# Patient Record
Sex: FEMALE | Race: WHITE | Employment: FULL TIME | URBAN - METROPOLITAN AREA
[De-identification: names, ages, dates, MRNs, and addresses within clinical notes are randomized per-mention and may not be internally consistent; named-entity substitution may affect disease eponyms.]

---

## 2017-03-08 ENCOUNTER — HOSPITAL ENCOUNTER (OUTPATIENT)
Dept: MRI IMAGING | Age: 52
Discharge: HOME | End: 2017-03-08
Attending: ORTHOPAEDIC SURGERY | Admitting: ORTHOPAEDIC SURGERY

## 2017-03-08 DIAGNOSIS — M25.511 PAIN IN RIGHT SHOULDER: ICD-10-CM

## 2017-03-08 DIAGNOSIS — M25.511 RIGHT SHOULDER PAIN, UNSPECIFIED CHRONICITY: ICD-10-CM

## 2017-05-03 ENCOUNTER — HOSPITAL ENCOUNTER (OUTPATIENT)
Dept: WOMENS IMAGING | Age: 52
Discharge: HOME | End: 2017-05-03
Attending: FAMILY MEDICINE | Admitting: FAMILY MEDICINE

## 2017-05-03 DIAGNOSIS — Z12.31 VISIT FOR SCREENING MAMMOGRAM: ICD-10-CM

## 2017-09-01 ENCOUNTER — HOSPITAL ENCOUNTER (OUTPATIENT)
Dept: OTHER | Age: 52
Discharge: HOME | End: 2017-09-01
Attending: FAMILY MEDICINE | Admitting: FAMILY MEDICINE

## 2017-09-01 LAB
EKG ATRIAL RATE: 68 BPM
EKG DIAGNOSIS: NORMAL
EKG P AXIS: 16 DEGREES
EKG P-R INTERVAL: 170 MS
EKG Q-T INTERVAL: 404 MS
EKG QRS DURATION: 102 MS
EKG QTC CALCULATION (BAZETT): 429 MS
EKG R AXIS: -37 DEGREES
EKG T AXIS: 26 DEGREES
EKG VENTRICULAR RATE: 68 BPM

## 2017-09-01 PROCEDURE — 93010 ELECTROCARDIOGRAM REPORT: CPT | Performed by: INTERNAL MEDICINE

## 2017-11-21 ENCOUNTER — OFFICE VISIT (OUTPATIENT)
Dept: GYNECOLOGY | Age: 52
End: 2017-11-21

## 2017-11-21 VITALS
WEIGHT: 169.4 LBS | RESPIRATION RATE: 17 BRPM | SYSTOLIC BLOOD PRESSURE: 131 MMHG | HEART RATE: 87 BPM | TEMPERATURE: 97.2 F | DIASTOLIC BLOOD PRESSURE: 81 MMHG | HEIGHT: 64 IN | BODY MASS INDEX: 28.92 KG/M2

## 2017-11-21 DIAGNOSIS — Z01.419 WELL WOMAN EXAM WITH ROUTINE GYNECOLOGICAL EXAM: Primary | ICD-10-CM

## 2017-11-21 DIAGNOSIS — Z78.0 MENOPAUSE: ICD-10-CM

## 2017-11-21 PROCEDURE — 99396 PREV VISIT EST AGE 40-64: CPT | Performed by: OBSTETRICS & GYNECOLOGY

## 2017-11-21 RX ORDER — ESTRADIOL 1 MG/1
1 TABLET ORAL NIGHTLY
Qty: 90 TABLET | Refills: 3 | Status: SHIPPED | OUTPATIENT
Start: 2017-11-21 | End: 2022-08-23

## 2017-11-24 LAB
HPV COMMENT: NORMAL
HPV TYPE 16: NOT DETECTED
HPV TYPE 18: NOT DETECTED
HPVOH (OTHER TYPES): NOT DETECTED

## 2017-11-26 ASSESSMENT — ENCOUNTER SYMPTOMS
RESPIRATORY NEGATIVE: 1
GASTROINTESTINAL NEGATIVE: 1
EYES NEGATIVE: 1

## 2017-11-26 NOTE — PROGRESS NOTES
Subjective:      Patient ID: Manpreet Juarez is a 46 y.o. female. Patient is here for annual. Patient with menopausal symptoms. Wants to know what she can do. Review of Systems   Constitutional: Negative. HENT: Negative. Eyes: Negative. Respiratory: Negative. Cardiovascular: Negative. Gastrointestinal: Negative. Genitourinary: Negative. Musculoskeletal: Negative. Skin: Negative. Neurological: Negative. Psychiatric/Behavioral: Negative. Date of Birth 1965  Past Medical History:   Diagnosis Date    Anxiety and depression     Cervical dysplasia 25 years ago    Dysmenorrhea 2012    Endometriosis 7-8 years ago    Wears glasses      Past Surgical History:   Procedure Laterality Date    ARM SURGERY Left     left arm fracture surgery    BUNIONECTOMY Right 2014     SECTION      couldn't deliver    DILATION AND CURETTAGE OF UTERUS  7-8 years ago    ENDOMETRIAL ABLATION      ENDOMETRIAL BIOPSY  2012    thickenedned endometrium    FOOT SURGERY Left 16    Silver type bunionectomy of the left 1st MPJ    HARDWARE REMOVAL      KNEE ARTHROSCOPY Right     LEEP  25 years ago    PELVIC LAPAROSCOPY      endometriosis    SHOULDER SURGERY      rotator cuff     OB History    Para Term  AB Living   1 1 1     1   SAB TAB Ectopic Molar Multiple Live Births             1      # Outcome Date GA Lbr Shubham/2nd Weight Sex Delivery Anes PTL Lv   1 Term  40w0d   F CS-Unspec   DRU      Birth Comments: couldn't get her out        Social History     Social History    Marital status:      Spouse name: N/A    Number of children: N/A    Years of education: N/A     Occupational History    Not on file.      Social History Main Topics    Smoking status: Former Smoker     Packs/day: 0.50     Years: 30.00     Quit date: 12/15/2011    Smokeless tobacco: Former User     Quit date: 2016      Comment: cessation , 10/15    Alcohol use

## 2018-05-09 ENCOUNTER — HOSPITAL ENCOUNTER (OUTPATIENT)
Dept: WOMENS IMAGING | Age: 53
Discharge: HOME | End: 2018-05-09
Attending: OBSTETRICS & GYNECOLOGY | Admitting: OBSTETRICS & GYNECOLOGY

## 2018-05-09 DIAGNOSIS — Z12.31 VISIT FOR SCREENING MAMMOGRAM: ICD-10-CM

## 2018-07-10 ENCOUNTER — HOSPITAL ENCOUNTER (OUTPATIENT)
Dept: MRI IMAGING | Age: 53
Discharge: HOME | End: 2018-07-10
Attending: NURSE PRACTITIONER | Admitting: NURSE PRACTITIONER

## 2018-07-10 DIAGNOSIS — M54.5 CHRONIC LOW BACK PAIN, UNSPECIFIED BACK PAIN LATERALITY, WITH SCIATICA PRESENCE UNSPECIFIED: ICD-10-CM

## 2018-07-10 DIAGNOSIS — G89.29 OTHER CHRONIC PAIN: ICD-10-CM

## 2018-07-10 DIAGNOSIS — G89.29 CHRONIC LOW BACK PAIN, UNSPECIFIED BACK PAIN LATERALITY, WITH SCIATICA PRESENCE UNSPECIFIED: ICD-10-CM

## 2018-08-23 ENCOUNTER — OFFICE VISIT (OUTPATIENT)
Dept: ORTHOPEDIC SURGERY | Age: 53
End: 2018-08-23

## 2018-08-23 VITALS
SYSTOLIC BLOOD PRESSURE: 117 MMHG | BODY MASS INDEX: 26.46 KG/M2 | HEIGHT: 64 IN | WEIGHT: 155 LBS | HEART RATE: 87 BPM | DIASTOLIC BLOOD PRESSURE: 82 MMHG | RESPIRATION RATE: 16 BRPM

## 2018-08-23 DIAGNOSIS — M54.16 LUMBAR RADICULITIS: Primary | ICD-10-CM

## 2018-08-23 PROCEDURE — 3017F COLORECTAL CA SCREEN DOC REV: CPT | Performed by: NURSE PRACTITIONER

## 2018-08-23 PROCEDURE — G8427 DOCREV CUR MEDS BY ELIG CLIN: HCPCS | Performed by: NURSE PRACTITIONER

## 2018-08-23 PROCEDURE — G8419 CALC BMI OUT NRM PARAM NOF/U: HCPCS | Performed by: NURSE PRACTITIONER

## 2018-08-23 PROCEDURE — 99243 OFF/OP CNSLTJ NEW/EST LOW 30: CPT | Performed by: NURSE PRACTITIONER

## 2018-08-23 RX ORDER — GABAPENTIN 300 MG/1
300 CAPSULE ORAL 3 TIMES DAILY
Qty: 40 CAPSULE | Refills: 0 | Status: SHIPPED | OUTPATIENT
Start: 2018-08-23 | End: 2018-09-05 | Stop reason: SDUPTHER

## 2018-08-23 NOTE — PROGRESS NOTES
History of present illness:   Ms. Diaz Jacob  is a pleasant 48 y.o. female here today for  Evaluation regarding her low back pain with right posterior greater than left posterior leg pain. She tells me she has had low back issues on off for the next 5-10 years. She began experiencing leg symptoms approximately 1 year ago without specific inciting event or injury. She denies any numbness, tingling, or weakness of either lower extremity. She tells me her pain is worse with extended periods of sitting. Pain travels down the posterior right leg into her calf. Extends down her left posterior leg into her mid thigh. She has tried oral steroids without much relief. She has tried OTC analgesics without much relief. She has tried chiropractic care without relief. She denies bowel or bladder dysfunction. This a consult for low back and right leg pain from Wolfgang Garcia MD.    Past history:  Her past medical, surgical and social history has been reviewed. Her  medications and allergies were reviewed. Review of symptoms:  Pertinent items are noted in HPI. Review of systems reviewed from Patient History Form dated on 8/23/18 and available in the patient's chart under the Media tab. Physical examination:  Ms. Selena Curling Fite's most recent vitals:  Vitals  Height: 5' 4\" (162.6 cm)  Weight: 155 lb (70.3 kg)  Body mass index is 26.61 kg/m². She is well-developed and well-nourished, is in  Some apparent discomfort and alert and oriented to person, place, and time. She demonstrates appropriate mood and affect. Her skin is warm and dry. Her gait is largely normal and she walks without assistive devices. She stands with slight lumbar flexion. Her lumbar flexion, extension and lateral bending are moderately reduced with pain. She has mild tenderness over her lumbar spine without obvious muscle spasm. The skin over her lumbar spine is normal without a surgical scar.  She has 5/5 motor strength of bilateral lower extremities. She has a negative straight leg raise, bilaterally. 1+ deep tendon reflexes at knees. Sensation is intact to light touch L3 to S1 bilaterally. She has no clonus. Hip range of motion painless. Imaging:  I reviewed MRI images of the lumbar spine from 7/10/18 in the office today. There is evidence of bilateral facet hypertrophy L3 through S1. There is moderate bilateral foraminal stenosis L4/5 secondary to broad-based disc bulge. Assessment:  Lumbar radiculitis  Lumbar facet arthropathy    Plan:   I recommend patient try formal physical therapy program.  A prescription for gabapentin was sent to her pharmacy and she was given appropriate administration instructions regarding this. If her symptoms do not improve the next 4-6 weeks she will call the office to consider a bilateral L4 TF NASEEM.

## 2021-11-01 ENCOUNTER — HOSPITAL ENCOUNTER (OUTPATIENT)
Dept: WOMENS IMAGING | Age: 56
Discharge: HOME | End: 2021-11-01
Payer: COMMERCIAL

## 2021-11-01 DIAGNOSIS — Z12.31 BREAST CANCER SCREENING BY MAMMOGRAM: ICD-10-CM

## 2021-11-01 PROCEDURE — 77067 SCR MAMMO BI INCL CAD: CPT

## 2022-08-23 ENCOUNTER — OFFICE VISIT (OUTPATIENT)
Dept: ORTHOPEDIC SURGERY | Age: 57
End: 2022-08-23
Payer: COMMERCIAL

## 2022-08-23 VITALS — RESPIRATION RATE: 16 BRPM | BODY MASS INDEX: 24.45 KG/M2 | HEIGHT: 63 IN | WEIGHT: 138 LBS

## 2022-08-23 DIAGNOSIS — M75.22 TENDINITIS OF LONG HEAD OF BICEPS BRACHII OF LEFT SHOULDER: Primary | ICD-10-CM

## 2022-08-23 DIAGNOSIS — G89.29 CHRONIC LEFT SHOULDER PAIN: ICD-10-CM

## 2022-08-23 DIAGNOSIS — M75.82 TENDINITIS OF LEFT ROTATOR CUFF: ICD-10-CM

## 2022-08-23 DIAGNOSIS — M25.512 CHRONIC LEFT SHOULDER PAIN: ICD-10-CM

## 2022-08-23 PROCEDURE — G8420 CALC BMI NORM PARAMETERS: HCPCS | Performed by: ORTHOPAEDIC SURGERY

## 2022-08-23 PROCEDURE — 99203 OFFICE O/P NEW LOW 30 MIN: CPT | Performed by: ORTHOPAEDIC SURGERY

## 2022-08-23 PROCEDURE — G8427 DOCREV CUR MEDS BY ELIG CLIN: HCPCS | Performed by: ORTHOPAEDIC SURGERY

## 2022-08-23 PROCEDURE — 4004F PT TOBACCO SCREEN RCVD TLK: CPT | Performed by: ORTHOPAEDIC SURGERY

## 2022-08-23 PROCEDURE — 3017F COLORECTAL CA SCREEN DOC REV: CPT | Performed by: ORTHOPAEDIC SURGERY

## 2022-08-23 RX ORDER — BUPROPION HYDROCHLORIDE 300 MG/1
TABLET ORAL
COMMUNITY
Start: 2022-08-16

## 2022-08-23 RX ORDER — DIAZEPAM 5 MG/1
TABLET ORAL
COMMUNITY
Start: 2022-06-13

## 2022-08-23 RX ORDER — TRAZODONE HYDROCHLORIDE 50 MG/1
TABLET ORAL
COMMUNITY
Start: 2022-08-11

## 2022-08-23 RX ORDER — ATORVASTATIN CALCIUM 20 MG/1
TABLET, FILM COATED ORAL
COMMUNITY
Start: 2022-08-08

## 2022-08-23 RX ORDER — LISINOPRIL 20 MG/1
TABLET ORAL
COMMUNITY
Start: 2022-08-07

## 2022-08-23 NOTE — PROGRESS NOTES
CHIEF COMPLAINT: Left shoulder pain    History:    Jorge Trejo is a 62 y.o. right handed female self-referred for evaluation and treatment of Left shoulder pain. This is evaluated as a personal injury. The pain began years ago. Pain is rated as a 6/10. There was not an injury. Pain is located anteriorly. Symptoms are worse with lifting, overhead activity, reaching behind her back, and laying on her side. The patient has not had PT. The patient has not had an injection. The patient has not tried NSAIDs. The patient has not tried ice. 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 Past Medical History:   Diagnosis Date    Anxiety and depression     Cervical dysplasia 25 years ago    Dysmenorrhea 2012    Endometriosis 7-8 years ago    Wears glasses        Past Surgical History:   Procedure Laterality Date    ARM SURGERY Left     left arm fracture surgery    BUNIONECTOMY Right 2014     SECTION      couldn't deliver    DILATION AND CURETTAGE OF UTERUS  7-8 years ago    ENDOMETRIAL ABLATION      ENDOMETRIAL BIOPSY  2012    thickenedned endometrium    FOOT SURGERY Left 16    Silver type bunionectomy of the left 1st MPJ    HARDWARE REMOVAL      KNEE ARTHROSCOPY Right     LEEP  25 years ago    PELVIC LAPAROSCOPY      endometriosis    SHOULDER SURGERY      rotator cuff       Current Outpatient Medications on File Prior to Visit   Medication Sig Dispense Refill    atorvastatin (LIPITOR) 20 MG tablet TAKE 1 TABLET BY MOUTH AT BEDTIME      buPROPion (WELLBUTRIN XL) 300 MG extended release tablet TAKE 1 TABLET BY MOUTH EVERY DAY      diazePAM (VALIUM) 5 MG tablet TAKE 1 TABLET BY MOUTH TWO TIMES A DAY AS NEEDED      lisinopril (PRINIVIL;ZESTRIL) 20 MG tablet TAKE 1 TABLET BY MOUTH EVERY DAY      traZODone (DESYREL) 50 MG tablet TAKE 1 TABLET BY MOUTH AT BEDTIME      PARoxetine (PAXIL) 20 MG tablet        No current facility-administered medications on file prior to visit. positive   Strength:  5/5 Supraspinatus, External rotation bilateral    Drop-arm test:   negative   Belly-press test:   negative   Bear-hug test:   negative   Speed's test:   positive   Bicipital groove tenderness:  positive   Arenas's test:   negative   Cross-body adduction test:   negative    AC joint tenderness:   negative     There are no skin lesions, cellulitis, or extreme edema in the upper extremities. Sensation is grossly intact to light touch bilaterally upper extremity. The patient has warm and well-perfused bilateral upper extremities with brisk capillary refill. Imaging   Left Shoulder X-Ray: 3 view x-rays of the shoulder including AP, scapular Y, and axillary obtained and reviewed  AC Joint: narrowing Marked, moderate osteophytes  Glenohumeral joint: no abnormalities noted  Elevation humeral head: absent      Assessment:      Left shoulder long head biceps tendinitis  Left shoulder rotator cuff tendinitis      Plan:      Natural history and expected course discussed. Questions answered. Ice as needed. Continue NSAIDs as needed. Discussed corticosteroid injection. She deferred at this time. PT referral provided. Follow-up in 2 months. Call IF NO improvement with PT after 4-6 weeks and I will order MRI prior to patient being seen back in office. Gerard Gallardo. Jen Bowie MD  Orthopaedic Surgery and Sports Medicine     Disclaimer: This note was generated with use of a verbal recognition program and an attempt was made to check for errors. It is possible that there are still dictated errors within this office note. If so, please bring any significant errors to my attention for an addendum. All efforts were made to ensure that this office note is accurate.

## 2022-09-01 ENCOUNTER — HOSPITAL ENCOUNTER (OUTPATIENT)
Dept: PHYSICAL THERAPY | Age: 57
Setting detail: THERAPIES SERIES
Discharge: HOME | End: 2022-09-01
Payer: COMMERCIAL

## 2022-09-01 NOTE — FLOWSHEET NOTE
[facility], [facility]  [address] [facility] [place], [facility]  Phone: [phone]   Fax:     [phone]    Physical Therapy  Cancellation/No-show Note  Patient Name:  Ac Becker  :  1965   Date:  2022  Cancelled visits to date: 1 - initial eval   No-shows to date: 0    Patient status for today's appointment patient:  [x]  Cancelled  []  Rescheduled appointment  []  No-show     Reason given by patient:  []  Patient ill  []  Conflicting appointment  []  No transportation    [x]  Conflict with work  []  No reason given  []  Other:     Comments:      Phone call information:   []  Phone call made today to patient at _ time at number provided:      []  Patient answered, conversation as follows:    []  Patient did not answer, message left as follows:  [x]  Phone call not made today    Electronically signed by:  Otoniel Turner, PTMPT [id number]

## 2022-09-09 ENCOUNTER — APPOINTMENT (OUTPATIENT)
Dept: PHYSICAL THERAPY | Age: 57
End: 2022-09-09
Payer: COMMERCIAL

## 2022-09-12 ENCOUNTER — HOSPITAL ENCOUNTER (OUTPATIENT)
Dept: PHYSICAL THERAPY | Age: 57
Setting detail: THERAPIES SERIES
Discharge: HOME | End: 2022-09-12
Payer: COMMERCIAL

## 2022-09-12 ENCOUNTER — APPOINTMENT (OUTPATIENT)
Dept: PHYSICAL THERAPY | Age: 57
End: 2022-09-12
Payer: COMMERCIAL

## 2022-09-12 PROCEDURE — 97530 THERAPEUTIC ACTIVITIES: CPT | Performed by: CHIROPRACTOR

## 2022-09-12 PROCEDURE — 97035 APP MDLTY 1+ULTRASOUND EA 15: CPT | Performed by: CHIROPRACTOR

## 2022-09-12 PROCEDURE — 97161 PT EVAL LOW COMPLEX 20 MIN: CPT | Performed by: CHIROPRACTOR

## 2022-09-12 NOTE — FLOWSHEET NOTE
[facility], [facility]  [address] [facility], [facility]  Phone: [phone]   Fax:     [phone]        Physical Therapy Treatment Note/ Progress Report:       Date:  2022    Patient Name:  Natali Jean Baptiste    :  1965  MRN: [id number]    Pertinent Medical History:Additional Pertinent Hx: Anxiety, Depression    Medical/Treatment Diagnosis Information:  Medical Diagnosis: Pain in left shoulder [M25.512]  Other chronic pain [G89.29]  Treatment Diagnosis: shoulder pain, weakness,  limiting ADLs    Insurance/Certification information:  [facility]  Physician Information:  Gume Boles MD  Plan of care signed (Y/N): Inbox    Date of Patient follow up with Physician:      Progress Report: []  Yes  [x]  No     Date Range for reporting period:  Beginnin2022  Ending:      Progress report due (10 Rx/or 30 days whichever is less):      Recertification due (POC duration/ or 90 days whichever is less):      Visit # POC/Insurance Allowable Auth Needed     [x]Yes    []No     Functional Outcomes Measure:    Date Assessed: at eval  Test: FOTO  Score: 50    Pain level:  0-8/10     History of Injury:     Gradual increase of pain over the years due to work/ activity                                       Previous Rot Cuff Repair on R shoulder    SUBJECTIVE:  See eval    OBJECTIVE:   Observation:   Test measurements:      RESTRICTIONS/PRECAUTIONS:     Exercises/Interventions:   Therapeutic Ex (58972)  Min: Resistance/Reps Notes/Cues   UBE X 2 min    T/slide   Rows                 Ext                  IR/ER Green - 1x10   B  Green - 1x10   B  Red - 1x10       R/L              Therapeutic Activity (36513) Min:                     NMR re-education (06043)  Min:                    Manual Intervention (49373) Min:      Passive stretch of shoulder  X 5 min (mainly IR)              Modalities:  Min     US    (ant self care, reaching, carrying, lifting, house/yardwork, driving/computer work      Manual Treatments:  PROM / STM / Oscillations-Mobs:  G-I, II, III, IV (PA's, Inf., Post.)  [] (58545) Provided manual therapy to mobilize soft tissue/joints of cervical/CT, scapular GHJ and UE for the purpose of modulating pain, promoting relaxation,  increasing ROM, reducing/eliminating soft tissue swelling/inflammation/restriction, improving soft tissue extensibility and allowing for proper ROM for normal function with self care, reaching, carrying, lifting, house/yardwork, driving/computer work    Charges:  Timed Code Treatment Minutes: 30   Total Treatment Minutes: 45       [x] EVAL (LOW) 41028 (typically 20 minutes face-to-face)  [] EVAL (MOD) 74613 (typically 30 minutes face-to-face)  [] EVAL (HIGH) 83483 (typically 45 minutes face-to-face)  [] RE-EVAL     [] TE(54456) x     [] Dry needle 1 or 2 Muscles (87409)  [] NMR (87116) x     [] Dry needle 3+ Muscles (72593)  [] Manual (79648) x     [x] Ultrasound (15483) x  [x] TA (00676) x     [] Mech Traction (08704)  [] ES(attended) (05227)     [] ES (un) (60684):   [] Vasopump (15072) [] Ionto (68231)   [] Other:    If [facility] Please Indicate Time In/Out  CPT Code Time in Time out                                   Approval Dates:  CPT Code Units Approved Units Used  Date Updated:                     GOALS:  Patient stated goal: Get rid of pain  [] Progressing: [] Met: [] Not Met: [] Adjusted    Therapist goals for Patient:   Short Term Goals: To be achieved in: 2 weeks  1. Independent in HEP and progression per patient tolerance, in order to prevent re-injury. [] Progressing: [] Met: [] Not Met: [] Adjusted  2. Patient will have a decrease in pain to facilitate improvement in movement, function, and ADLs as indicated by Functional Deficits. [] Progressing: [] Met: [] Not Met: [] Adjusted    Long Term Goals: To be achieved in: 6 weeks  1.  Increase FOTO functional outcome score from 50 to 64 to assist with reaching prior level of function. [] Progressing: [] Met: [] Not Met: [] Adjusted  2. Patient will demonstrate an increase in NM recruitment/activation and overall GH and scapular strength to within n5lbs HHD or WNL for proper functional mobility as indicated by patients Functional Deficits. [] Progressing: [] Met: [] Not Met: [] Adjusted  3. Patient will return to usual  activities without increased symptoms or restriction. [] Progressing: [] Met: [] Not Met: [] Adjusted    ASSESSMENT:  See eval    Treatment/Activity Tolerance:  [x] Patient tolerated treatment well [] Patient limited by fatique  [] Patient limited by pain  [] Patient limited by other medical complications  [] Other:     Overall Progression Towards Functional goals/ Treatment Progress Update:  [] Patient is progressing as expected towards functional goals listed. [] Progression is slowed due to complexities/Impairments listed. [] Progression has been slowed due to co-morbidities. [x] Plan just implemented, too soon to assess goals progression <30days   [] Goals require adjustment due to lack of progress  [] Patient is not progressing as expected and requires additional follow up with physician  [] Other    Prognosis for POC: [x] Good [] Fair  [] Poor    Patient requires continued skilled intervention: [x] Yes  [] No      PLAN: See eval  [] Continue per plan of care [] Alter current plan (see comments)  [x] Plan of care initiated [] Hold pending MD visit [] Discharge    Electronically signed by: Lluvia PLASCENCIA#[id number]    Note: If patient does not return for scheduled/recommended follow up visits, this note will serve as a discharge from care along with the most recent update on progress.

## 2022-09-12 NOTE — PLAN OF CARE
[facility]  [address] [facility]  Phone: [phone]   Fax:     [phone]                                                         Physical Therapy Certification    Dear Jacquie Wolf MD,    We had the pleasure of evaluating the following patient for physical therapy services at [facility]. A summary of our findings can be found in the initial assessment below. This includes our plan of care. If you have any questions or concerns regarding these findings, please do not hesitate to contact me at the office phone number checked above. Thank you for the referral.       Physician Signature:_______________________________Date:__________________  By signing above (or electronic signature), therapists plan is approved by physician        Patient: Shelly Panda   : 1965   MRN: [id number]  Referring Physician: Jacquie Wolf MD      Evaluation Date: 2022      Medical Diagnosis Information:  Medical Diagnosis: Pain in left shoulder [M25.512]  Other chronic pain [G89.29]   Treatment Diagnosis: shoulder pain, weakness,  limiting ADLs                                         Insurance information: PT Insurance Information: [employer]      Precautions/ Contra-indications:   Latex Allergy:   [x]  NO      []YES  Preferred Language for Healthcare:   [x]English       []other:    C-SSRS Triggered by Intake questionnaire (Past 2 wk assessment ):   [] No, Questionnaire did not trigger screening. [x] Yes, Patient intake triggered C-SSRS Screening      [] C-SSRS Screening completed  [] PCP notified via Epic   C-SSRS Triggered by Intake questionnaire:     YES NO   In the past month, have you wished you were dead or wished you could go to sleep and not wake up? X   In the past month, have you had any thoughts of killing yourself?   X Lifetime   YES NO   Have you ever done anything, started to do anything, or prepared to do anything to end your life? X             Past 3 months    X        SUBJECTIVE: Patient stated complaint: C/o pain in both shoulders (L>R)    Relevant Medical History:Additional Pertinent Hx: Anxiety, Depression, R Rot Cuff Surgery ~ 5 yrs ago  Functional Scale/Score: FOTO = 50    Pain Scale: Ranges 0-8/10  Easing factors: Rest  Provocative factors: Activity, especially lifting     Type: []Constant   []Intermittent  []Radiating []Localized []other:     Numbness/Tingling: Occasional N&T in L UE    Occupation/School:  Food prep at [employer]     Living Status/Prior Level of Function: Independent with ADLs    OBJECTIVE:   Palpation: TTP anterior aspect of L shoulder (specially Bicipital Groove)    Functional Mobility/Transfers:     Posture: Good    Bandages/Dressings/Incisions: NA    Gait: (include devices/WB status): WNL     CERV ROM     Cervical Flexion     Cervical Extension     Cervical SB     Cervical rotation          ROM Left Right   Shoulder Flex WNL WNL   Shoulder Abd WNL WNL   Shoulder ER WNL WNL   Shoulder IR 55 45             Strength  Left Right   Shoulder Flex 4+/5 WNL   Shoulder Scap 4/5 WNL   Shoulder ER WNL WNL   Shoulder IR WNL WNL           Reflexes Normal Abnormal Comments   []ALL NORMAL            S1-2 Seated achilles [] []    S1-2 Prone knee bend [] []    L3-4 Patellar tendon [] []    C5-6 Biceps [] []    C6 Brachioradialis [] []    C7-8 Triceps [] []    Clonus [] []    Babinski [] []    Narvaez's [] []      Reflexes/Sensation:    [x]Dermatomes/Myotomes intact    []Reflexes equal and normal bilaterally   []Other:    Joint mobility:    []Normal    []Hypo   []Hyper    Orthopedic Special Tests:                        [x] Patient history, allergies, meds reviewed. Medical chart reviewed. See intake form.      Review Of Systems (ROS):  [x]Performed Review of systems (Integumentary, CardioPulmonary, Neurological) by intake and observation. Intake form has been scanned into medical record. Patient has been instructed to contact their primary care physician regarding ROS issues if not already being addressed at this time. Co-morbidities/Complexities (which will affect course of rehabilitation):   []None           Arthritic conditions   []Rheumatoid arthritis (M05.9)  []Osteoarthritis (M19.91)   Cardiovascular conditions   []Hypertension (I10)  []Hyperlipidemia (E78.5)  []Angina pectoris (I20)  []Atherosclerosis (I70)  []CVA Musculoskeletal conditions   []Disc pathology   []Congenital spine pathologies   []Prior surgical intervention  []Osteoporosis (M81.8)  []Osteopenia (M85.8)   Endocrine conditions   []Hypothyroid (E03.9)  []Hyperthyroid Gastrointestinal conditions   []Constipation (V87.32)   Metabolic conditions   []Morbid obesity (E66.01)  []Diabetes type 1(E10.65) or 2 (E11.65)   []Neuropathy (G60.9)     Pulmonary conditions   []Asthma (J45)  []Coughing   []COPD (J44.9)   Psychological Disorders  [x]Anxiety (F41.9)  [x]Depression (F32.9)   []Other:   []Other:          Barriers to/and or personal factors that will affect rehab potential:              []Age  []Sex   []Smoker              []Motivation/Lack of Motivation                        []Co-Morbidities              []Cognitive Function, education/learning barriers              []Environmental, home barriers              []profession/work barriers  []past PT/medical experience  []other:  Justification:     Falls Risk Assessment (30 days):   [x] Falls Risk assessed and no intervention required.   [] Falls Risk assessed and Patient requires intervention due to being higher risk   TUG score (>12s at risk):     [] Falls education provided, including         ASSESSMENT:    Functional Impairments:     []Noted spinal or UE joint hypomobility   []Noted spinal or UE joint hypermobility   []Decreased spinal/UE functional ROM   []Abnormal reflexes/sensation/myotomal/dermatomal deficits   []Decreased RC/scapular/core strength and neuromuscular control    []Decreased UE functional strength   []other:      Functional Activity Limitations (from functional questionnaire and intake)   []Reduced ability to tolerate prolonged functional positions   []Reduced ability or difficulty with changes of positions or transfers between positions   [x]Reduced ability to maintain good posture and demonstrate good body mechanics with sitting, bending, and lifting   [] Reduced ability or tolerance with driving and/or computer work   [x]Reduced ability to perform lifting, reaching, carrying tasks   [x]Reduced ability to reach behind back   [x]Reduced ability to sleep    [x]Reduced ability to tolerate any impact through UE or spine   []Reduced ability to  or hold objects   [x]Reduced ability to throw or toss an object   []other:    Participation Restrictions   [x]Reduced participation in self care activities   []Reduced participation in home management activities   []Reduced participation in work activities   []Reduced participation in social activities. []Reduced participation in sport/recreational activities. Classification/Subgrouping:   []signs/symptoms consistent with post-surgical status including decreased ROM, strength and function.     []signs/symptoms consistent with joint sprain/strain    []signs/symptoms consistent with shoulder impingement (internal, external, primary or secondary)   [x]signs/symptoms consistent with shoulder/elbow/wrist tendinopathy   []Signs/symptoms consistent with Rotator cuff tear   []sign/symptoms consistent with labral tear   []signs/symptoms consistent with rib dysfunction   []signs/symptoms consistent with postural dysfunction   []signs/symptoms consistent with Glenohumeral IR Deficit - <45 degrees   []signs/symptoms consistent with facet dysfunction of cervical/thoracic spine   []signs/symptoms consistent with pathology which may benefit from Dry Needling   []signs/symptoms which may limit the use of advanced manual therapy techniques: (Elevated CV risk profile, recent trauma, intolerance to end range positions, prior TIA, visual issues, UE neurological compromise )     Prognosis/Rehab Potential:      []Excellent   [x]Good    []Fair   []Poor    Tolerance of evaluation/treatment:    []Excellent   [x]Good    []Fair   []Poor    Physical Therapy Evaluation Complexity Justification  [x] A history of present problem with:  [] no personal factors and/or comorbidities that impact the plan of care;  [x]1-2 personal factors and/or comorbidities that impact the plan of care  []3 personal factors and/or comorbidities that impact the plan of care  [x] An examination of body systems using standardized tests and measures addressing any of the following: body structures and functions (impairments), activity limitations, and/or participation restrictions;:  [x] a total of 1-2 or more elements   [] a total of 3 or more elements   [] a total of 4 or more elements   [x] A clinical presentation with:  [x] stable and/or uncomplicated characteristics   [] evolving clinical presentation with changing characteristics  [] unstable and unpredictable characteristics;   [x] Clinical decision making of [] low, [] moderate, [] high complexity using standardized patient assessment instrument and/or measurable assessment of functional outcome. [x] EVAL (LOW) 44663 (typically 20 minutes face-to-face)  [] EVAL (MOD) 70481 (typically 30 minutes face-to-face)  [] EVAL (HIGH) 16736 (typically 45 minutes face-to-face)  [] RE-EVAL     PLAN: Shoulder strengthening  Frequency/Duration:  2 days per week for 6 Weeks:  Interventions:  [x]  Therapeutic exercise including: strength training, ROM, for scapula, core and Upper extremity, including postural re-education.    [x]  NMR activation and proprioception for UE, periscapular and RC muscles and Core, including postural re-education. [x]  Manual therapy as indicated for shoulder, scapula, spine and associated soft tissue including: Dry Needling/IASTM, STM, PROM, Gr I-IV mobilizations, manipulation. [x] Modalities as needed that may include: thermal agents, E-stim, Biofeedback, US, iontophoresis as indicated  [x] Patient education on joint protection, postural re-education, activity modification, progression of HEP. [] Aquatic exercise including: strength training, ROM, for scapula, core and Upper extremity, including postural re-education. HEP instruction: Voiced understanding of home exer/ precautions    GOALS:  Patient stated goal: Get rid of pain  [] Progressing: [] Met: [] Not Met: [] Adjusted    Therapist goals for Patient:   Short Term Goals: To be achieved in: 2 weeks  1. Independent in HEP and progression per patient tolerance, in order to prevent re-injury. [] Progressing: [] Met: [] Not Met: [] Adjusted  2. Patient will have a decrease in pain to facilitate improvement in movement, function, and ADLs as indicated by Functional Deficits. [] Progressing: [] Met: [] Not Met: [] Adjusted    Long Term Goals: To be achieved in: 6 weeks  1. Increase FOTO functional outcome score from 50 to 64 to assist with reaching prior level of function. [] Progressing: [] Met: [] Not Met: [] Adjusted  2. Patient will demonstrate an increase in NM recruitment/activation and overall GH and scapular strength to within n5lbs HHD or WNL for proper functional mobility as indicated by patients Functional Deficits. [] Progressing: [] Met: [] Not Met: [] Adjusted  3. Patient will return to usual  activities without increased symptoms or restriction. [] Progressing: [] Met: [] Not Met: [] Adjusted      Electronically signed by:  Bud Lopze #[id number]      Note: If patient does not return for scheduled/recommended follow up visits, this note will serve as a discharge from care along with the most recent update on progress.

## 2022-09-14 ENCOUNTER — HOSPITAL ENCOUNTER (OUTPATIENT)
Dept: PHYSICAL THERAPY | Age: 57
Setting detail: THERAPIES SERIES
Discharge: HOME | End: 2022-09-14
Payer: COMMERCIAL

## 2022-09-14 PROCEDURE — 97035 APP MDLTY 1+ULTRASOUND EA 15: CPT

## 2022-09-14 PROCEDURE — 97110 THERAPEUTIC EXERCISES: CPT

## 2022-09-14 NOTE — FLOWSHEET NOTE
[facility], [facility]  [address] [facility], [facility]  Phone: [phone]   Fax:     [phone]        Physical Therapy Treatment Note/ Progress Report:       Date:  2022    Patient Name:  Pete Catalan    :  1965  MRN: [id number]    Pertinent Medical History:Additional Pertinent Hx: Anxiety, Depression    Medical/Treatment Diagnosis Information:  Medical Diagnosis: Pain in left shoulder [M25.512]  Other chronic pain [G89.29]  Treatment Diagnosis: shoulder pain, weakness,  limiting ADLs    Insurance/Certification information:  [facility]  Physician Information:  Telly Banks MD  Plan of care signed (Y/N): Inbox    Date of Patient follow up with Physician:      Progress Report: []  Yes  [x]  No     Date Range for reporting period:  Beginnin2022  Ending:      Progress report due (10 Rx/or 30 days whichever is less):      Recertification due (POC duration/ or 90 days whichever is less):      Visit # POC/Insurance Allowable Auth Needed    144 units  22 to 22 [x]Yes    []No     Functional Outcomes Measure:    Date Assessed: at eval  Test: FOTO  Score: 50    Pain level:  0/10 at rest, with activity 8/10    History of Injury:     Gradual increase of pain over the years due to work/ activity                                       Previous Rot Cuff Repair on R shoulder    SUBJECTIVE:  See eval  22 pain varies according to activity. Using heat at home.         OBJECTIVE:   Observation:   Test measurements:      RESTRICTIONS/PRECAUTIONS:     Exercises/Interventions:   Therapeutic Ex (97971)  Min: Resistance/Reps Notes/Cues   UBE X 2 min    T/slide   Rows                 Ext                  IR/ER Green - 1x10   B  Green - 1x10   B  Red - 1x10       R/L Cues for tech and count reps             Therapeutic Activity (41933) Min:  Recommended trial ice at home 10-15 min especially after work                   NMR re-education (63191)  Min:                    Manual Intervention (16840) Min:      Passive stretch of  L shoulder  X 5 min (mainly IR)              Modalities:  Min     US    (ant  L  shoulder)  100% 1.5 w/cm2 X 8 min           Other Therapeutic Activities:  Pt was educated on PT POC, Diagnosis, Prognosis, pathomechanics as well as frequency and duration of scheduling future physical therapy appointments. Time was also taken on this day to answer all patient questions and participation in PT. Reviewed appointment policy in detail with patient and patient verbalized understanding. Home Exercise Program: Patient instructed in the following for HEP:          .   Patient verbalized/demonstrated understanding and was issued written handout. Therapeutic Exercise and NMR EXR  [] (50318) Provided verbal/tactile cueing for activities related to strengthening, flexibility, endurance, ROM  for improvements in scapular, scapulothoracic and UE control with self care, reaching, carrying, lifting, house/yardwork, driving/computer work.    [] (39635) Provided verbal/tactile cueing for activities related to improving balance, coordination, kinesthetic sense, posture, motor skill, proprioception  to assist with  scapular, scapulothoracic and UE control with self care, reaching, carrying, lifting, house/yardwork, driving/computer work. Therapeutic Activities:    [] (66980 or 95128) Provided verbal/tactile cueing for activities related to improving balance, coordination, kinesthetic sense, posture, motor skill, proprioception and motor activation to allow for proper function of scapular, scapulothoracic and UE control with self care, carrying, lifting, driving/computer work.      Home Exercise Program:    [x] (28351) Reviewed/Progressed HEP activities related to strengthening, flexibility, endurance, ROM of scapular, scapulothoracic and UE control with self care, reaching, carrying, lifting, house/yardwork, driving/computer work  [] (90583) Reviewed/Progressed HEP activities related to improving balance, coordination, kinesthetic sense, posture, motor skill, proprioception of scapular, scapulothoracic and UE control with self care, reaching, carrying, lifting, house/yardwork, driving/computer work      Manual Treatments:  PROM / STM / Oscillations-Mobs:  G-I, II, III, IV (PA's, Inf., Post.)  [] ([id number]) Provided manual therapy to mobilize soft tissue/joints of cervical/CT, scapular GHJ and UE for the purpose of modulating pain, promoting relaxation,  increasing ROM, reducing/eliminating soft tissue swelling/inflammation/restriction, improving soft tissue extensibility and allowing for proper ROM for normal function with self care, reaching, carrying, lifting, house/yardwork, driving/computer work    Charges:  Timed Code Treatment Minutes: 35   Total Treatment Minutes: 35       [] EVAL (LOW) 66991 (typically 20 minutes face-to-face)  [] EVAL (MOD) 89084 (typically 30 minutes face-to-face)  [] EVAL (HIGH) 81578 (typically 45 minutes face-to-face)  [] RE-EVAL     [x] IF(78581) x     [] Dry needle 1 or 2 Muscles (81969)  [] NMR (52136) x     [] Dry needle 3+ Muscles (43646)  [] Manual (16651) x     [x] Ultrasound (07381) x  [] TA (05964) x     [] Mech Traction (50691)  [] ES(attended) (67560)     [] ES (un) (50589):   [] Vasopump (86299) [] Ionto (35710)   [] Other:    I    Approval Dates: 144 units 9-12-22 to 11-30-22  CPT Code Units Approved Units Used  Date Updated:     144 units 5               GOALS:  Patient stated goal: Get rid of pain  [] Progressing: [] Met: [] Not Met: [] Adjusted    Therapist goals for Patient:   Short Term Goals: To be achieved in: 2 weeks  1. Independent in HEP and progression per patient tolerance, in order to prevent re-injury. [] Progressing: [] Met: [] Not Met: [] Adjusted  2.  Patient will have a decrease in pain to facilitate improvement in movement, function, and ADLs as indicated by Functional Deficits. [] Progressing: [] Met: [] Not Met: [] Adjusted    Long Term Goals: To be achieved in: 6 weeks  1. Increase FOTO functional outcome score from 50 to 64 to assist with reaching prior level of function. [] Progressing: [] Met: [] Not Met: [] Adjusted  2. Patient will demonstrate an increase in NM recruitment/activation and overall GH and scapular strength to within n5lbs HHD or WNL for proper functional mobility as indicated by patients Functional Deficits. [] Progressing: [] Met: [] Not Met: [] Adjusted  3. Patient will return to usual  activities without increased symptoms or restriction. [] Progressing: [] Met: [] Not Met: [] Adjusted    ASSESSMENT:    9-14-22 good tolerance to today's treatment. Instructed in trial of ice vs heat to see if more helpful. Treatment/Activity Tolerance:  [x] Patient tolerated treatment well [] Patient limited by fatique  [] Patient limited by pain  [] Patient limited by other medical complications  [] Other:     Overall Progression Towards Functional goals/ Treatment Progress Update:  [] Patient is progressing as expected towards functional goals listed. [] Progression is slowed due to complexities/Impairments listed. [] Progression has been slowed due to co-morbidities.   [x] Plan just implemented, too soon to assess goals progression <30days   [] Goals require adjustment due to lack of progress  [] Patient is not progressing as expected and requires additional follow up with physician  [] Other    Prognosis for POC: [x] Good [] Fair  [] Poor    Patient requires continued skilled intervention: [x] Yes  [] No      PLAN: See eval  [] Continue per plan of care [] Alter current plan (see comments)  [x] Plan of care initiated [] Hold pending MD visit [] Discharge    Electronically signed by: Tracy Harmon,     Note: If patient does not return for scheduled/recommended follow up visits, this note will serve as a discharge from care along with the most recent update on progress.

## 2022-09-19 ENCOUNTER — APPOINTMENT (OUTPATIENT)
Dept: PHYSICAL THERAPY | Age: 57
End: 2022-09-19
Payer: COMMERCIAL

## 2022-09-21 ENCOUNTER — HOSPITAL ENCOUNTER (OUTPATIENT)
Dept: PHYSICAL THERAPY | Age: 57
Setting detail: THERAPIES SERIES
Discharge: HOME | End: 2022-09-21
Payer: COMMERCIAL

## 2022-09-21 ENCOUNTER — APPOINTMENT (OUTPATIENT)
Dept: PHYSICAL THERAPY | Age: 57
End: 2022-09-21
Payer: COMMERCIAL

## 2022-09-21 PROCEDURE — G0283 ELEC STIM OTHER THAN WOUND: HCPCS | Performed by: CHIROPRACTOR

## 2022-09-21 PROCEDURE — 97110 THERAPEUTIC EXERCISES: CPT | Performed by: CHIROPRACTOR

## 2022-09-21 NOTE — FLOWSHEET NOTE
[facility], [facility]  [address] [facility] [place], [facility]  Phone: [phone]   Fax:     [phone]        Physical Therapy Treatment Note/ Progress Report:       Date:  2022    Patient Name:  Tamara Godoy    :  1965  MRN: [id number]    Pertinent Medical History:Additional Pertinent Hx: Anxiety, Depression    Medical/Treatment Diagnosis Information:  Medical Diagnosis: Pain in left shoulder [M25.512]  Other chronic pain [G89.29]  Treatment Diagnosis: shoulder pain, weakness,  limiting ADLs    Insurance/Certification information:  [facility]  Physician Information:  Josesito Fallon MD  Plan of care signed (Y/N): Inbox    Date of Patient follow up with Physician:      Progress Report: []  Yes  [x]  No     Date Range for reporting period:  Beginnin2022  Ending:      Progress report due (10 Rx/or 30 days whichever is less):      Recertification due (POC duration/ or 90 days whichever is less):      Visit # POC/Insurance Allowable Auth Needed   3 / 12 144 units  22 to 22 [x]Yes    []No     Functional Outcomes Measure:    Date Assessed: at eval  Test: FOTO  Score: 50    Pain level:  Ranges 2-10/10    History of Injury:     Gradual increase of pain over the years due to work/ activity                                       Previous Rot Cuff Repair on R shoulder    SUBJECTIVE:  See eval  22 pain varies according to activity. Using heat at home. 22 - States that pain with activity is increasing.  Also c/o increased \"popping\" on anterior aspect of shoulder        OBJECTIVE:   Observation:   Test measurements:      RESTRICTIONS/PRECAUTIONS:     Exercises/Interventions:   Therapeutic Ex (79712)  Min: Resistance/Reps Notes/Cues   UBE X 2 min    T/slide   Rows                 Ext                  ER  only Green - 1x10   B  Green - 1x10   B  Red - 1x10       R/L Anterior shoulder \"popping\"with IR        Supine protraction     Supine horiz add     Prone horiz abd          Therapeutic Activity (83988) Min:  Recommended trial ice at home 10-15 min especially after work                        NMR re-education (62951)  Min:                    Manual Intervention (95037) Min:      Passive stretch of  L shoulder  X 5 min               Modalities:  Min        E-stim    (supine) IFC with CP x 15 min      Other Therapeutic Activities:  Pt was educated on PT POC, Diagnosis, Prognosis, pathomechanics as well as frequency and duration of scheduling future physical therapy appointments. Time was also taken on this day to answer all patient questions and participation in PT. Reviewed appointment policy in detail with patient and patient verbalized understanding. Home Exercise Program: Patient instructed in the following for HEP:          .   Patient verbalized/demonstrated understanding and was issued written handout. Therapeutic Exercise and NMR EXR  [] (80720) Provided verbal/tactile cueing for activities related to strengthening, flexibility, endurance, ROM  for improvements in scapular, scapulothoracic and UE control with self care, reaching, carrying, lifting, house/yardwork, driving/computer work.    [] (52937) Provided verbal/tactile cueing for activities related to improving balance, coordination, kinesthetic sense, posture, motor skill, proprioception  to assist with  scapular, scapulothoracic and UE control with self care, reaching, carrying, lifting, house/yardwork, driving/computer work. Therapeutic Activities:    [] (89526 or 99946) Provided verbal/tactile cueing for activities related to improving balance, coordination, kinesthetic sense, posture, motor skill, proprioception and motor activation to allow for proper function of scapular, scapulothoracic and UE control with self care, carrying, lifting, driving/computer work.      Home Exercise Program:    [x] (47077) tolerance, in order to prevent re-injury. [] Progressing: [] Met: [] Not Met: [] Adjusted  2. Patient will have a decrease in pain to facilitate improvement in movement, function, and ADLs as indicated by Functional Deficits. [] Progressing: [] Met: [] Not Met: [] Adjusted    Long Term Goals: To be achieved in: 6 weeks  1. Increase FOTO functional outcome score from 50 to 64 to assist with reaching prior level of function. [] Progressing: [] Met: [] Not Met: [] Adjusted  2. Patient will demonstrate an increase in NM recruitment/activation and overall GH and scapular strength to within n5lbs HHD or WNL for proper functional mobility as indicated by patients Functional Deficits. [] Progressing: [] Met: [] Not Met: [] Adjusted  3. Patient will return to usual  activities without increased symptoms or restriction. [] Progressing: [] Met: [] Not Met: [] Adjusted    ASSESSMENT:    9-14-22 good tolerance to today's treatment. Instructed in trial of ice vs heat to see if more helpful. Treatment/Activity Tolerance:  [x] Patient tolerated treatment well [] Patient limited by fatique  [] Patient limited by pain  [] Patient limited by other medical complications  [] Other:     Overall Progression Towards Functional goals/ Treatment Progress Update:  [] Patient is progressing as expected towards functional goals listed. [] Progression is slowed due to complexities/Impairments listed. [] Progression has been slowed due to co-morbidities.   [x] Plan just implemented, too soon to assess goals progression <30days   [] Goals require adjustment due to lack of progress  [] Patient is not progressing as expected and requires additional follow up with physician  [] Other    Prognosis for POC: [x] Good [] Fair  [] Poor    Patient requires continued skilled intervention: [x] Yes  [] No      PLAN: See eval  [] Continue per plan of care [] Alter current plan (see comments)  [x] Plan of care initiated [] Hold pending MD visit [] Discharge    Electronically signed by: Demetrice Medina #[id number]    Note: If patient does not return for scheduled/recommended follow up visits, this note will serve as a discharge from care along with the most recent update on progress.

## 2022-09-26 ENCOUNTER — HOSPITAL ENCOUNTER (OUTPATIENT)
Dept: PHYSICAL THERAPY | Age: 57
Setting detail: THERAPIES SERIES
Discharge: HOME | End: 2022-09-26
Payer: COMMERCIAL

## 2022-09-26 PROCEDURE — G0283 ELEC STIM OTHER THAN WOUND: HCPCS

## 2022-09-26 PROCEDURE — 97110 THERAPEUTIC EXERCISES: CPT

## 2022-09-26 NOTE — FLOWSHEET NOTE
[facility]  [address] [facility]  Phone: [phone]   Fax:     [phone]        Physical Therapy Treatment Note/ Progress Report:       Date:  2022    Patient Name:  Griselda Parsons    :  1965  MRN: [id number]    Pertinent Medical History:Additional Pertinent Hx: Anxiety, Depression    Medical/Treatment Diagnosis Information:  Medical Diagnosis: Pain in left shoulder [M25.512]  Other chronic pain [G89.29]  Treatment Diagnosis: shoulder pain, weakness,  limiting ADLs    Insurance/Certification information:  [facility]  Physician Information:  Zaid Arnett MD  Plan of care signed (Y/N): Inbox    Date of Patient follow up with Physician:  F/U      Progress Report: []  Yes  [x]  No     Date Range for reporting period:  Beginnin2022  Ending:      Progress report due (10 Rx/or 30 days whichever is less):      Recertification due (POC duration/ or 90 days whichever is less):      Visit # POC/Insurance Allowable Auth Needed    144 units  22 to 22 [x]Yes    []No     Functional Outcomes Measure:    Date Assessed: at eval  Test: FOTO  Score: 50    Pain level:  Ranges 2/10    History of Injury:     Gradual increase of pain over the years due to work/ activity                                       Previous Rot Cuff Repair on R shoulder    SUBJECTIVE:  See eval  22 pain varies according to activity. Using heat at home. 22 - States that pain with activity is increasing. Also c/o increased \"popping\" on anterior aspect of shoulder  22 reports no improvement thus far, possibly a little bit worse with a lot of popping/ grinding. At rest some relief, increases with activity/ certain movements.          OBJECTIVE:   Observation:   Test measurements:      RESTRICTIONS/PRECAUTIONS: sa    Exercises/Interventions:   Therapeutic Ex (99882)  Min: Resistance/Reps Notes/Cues   UBE X 2 min    T/slide   Rows                 Ext                  ER  only Green - 2 x10   B  Green - 2x10   B  Red - 1x10       R/L      Anterior shoulder \"popping\"with IR        Supine protraction 0# x 10 L    Supine horiz add 0# x 10 L     Prone horiz abd 0# x 10 L          Therapeutic Activity (58207) Min:  Recommended trial ice at home 10-15 min especially after work Doing ice ~ 2 x / day. NMR re-education (64902)  Min:                    Manual Intervention (89209) Min:      Passive stretch of  L shoulder  X 5 min               Modalities:  Min        E-stim    (supine) IFC with CP x 15 min More helpful than US     Other Therapeutic Activities:  Pt was educated on PT POC, Diagnosis, Prognosis, pathomechanics as well as frequency and duration of scheduling future physical therapy appointments. Time was also taken on this day to answer all patient questions and participation in PT. Reviewed appointment policy in detail with patient and patient verbalized understanding. Home Exercise Program: Patient instructed in the following for HEP:          .   Patient verbalized/demonstrated understanding and was issued written handout. Therapeutic Exercise and NMR EXR  [] (70830) Provided verbal/tactile cueing for activities related to strengthening, flexibility, endurance, ROM  for improvements in scapular, scapulothoracic and UE control with self care, reaching, carrying, lifting, house/yardwork, driving/computer work.    [] (94190) Provided verbal/tactile cueing for activities related to improving balance, coordination, kinesthetic sense, posture, motor skill, proprioception  to assist with  scapular, scapulothoracic and UE control with self care, reaching, carrying, lifting, house/yardwork, driving/computer work.     Therapeutic Activities:    [] (01312 or ) Provided verbal/tactile cueing for activities related to improving balance, coordination, kinesthetic sense, posture, motor skill, proprioception and motor activation to allow for proper function of scapular, scapulothoracic and UE control with self care, carrying, lifting, driving/computer work.      Home Exercise Program:    [x] (74975) Reviewed/Progressed HEP activities related to strengthening, flexibility, endurance, ROM of scapular, scapulothoracic and UE control with self care, reaching, carrying, lifting, house/yardwork, driving/computer work  [] (69661) Reviewed/Progressed HEP activities related to improving balance, coordination, kinesthetic sense, posture, motor skill, proprioception of scapular, scapulothoracic and UE control with self care, reaching, carrying, lifting, house/yardwork, driving/computer work      Manual Treatments:  PROM / STM / Oscillations-Mobs:  G-I, II, III, IV (PA's, Inf., Post.)  [] (55156) Provided manual therapy to mobilize soft tissue/joints of cervical/CT, scapular GHJ and UE for the purpose of modulating pain, promoting relaxation,  increasing ROM, reducing/eliminating soft tissue swelling/inflammation/restriction, improving soft tissue extensibility and allowing for proper ROM for normal function with self care, reaching, carrying, lifting, house/yardwork, driving/computer work    Charges:  Timed Code Treatment Minutes: 25   Total Treatment Minutes: 40       [] EVAL (LOW) 72697 (typically 20 minutes face-to-face)  [] EVAL (MOD) 68421 (typically 30 minutes face-to-face)  [] EVAL (HIGH) 70520 (typically 45 minutes face-to-face)  [] RE-EVAL     [x] KI(66416) x   2  [] Dry needle 1 or 2 Muscles (76699)  [] NMR (04403) x     [] Dry needle 3+ Muscles (70846)  [] Manual (21206) x     [] Ultrasound (63480) x  [] TA (75781) x     [] Mech Traction (10068)  [] ES(attended) (32743)     [x] ES (un) (94110):   [] Vasopump (85885) [] Ionto (39167)   [] Other:    I    Approval Dates: 144 units 9-12-22 to 11-30-22  CPT Code Units Approved Units Used  Date Updated:     144 units 9/26/22 - 7               GOALS:  Patient stated goal: Get rid of pain  [] Progressing: [] Met: [] Not Met: [] Adjusted    Therapist goals for Patient:   Short Term Goals: To be achieved in: 2 weeks  1. Independent in HEP and progression per patient tolerance, in order to prevent re-injury. [] Progressing: [] Met: [] Not Met: [] Adjusted  2. Patient will have a decrease in pain to facilitate improvement in movement, function, and ADLs as indicated by Functional Deficits. [] Progressing: [] Met: [] Not Met: [] Adjusted    Long Term Goals: To be achieved in: 6 weeks  1. Increase FOTO functional outcome score from 50 to 64 to assist with reaching prior level of function. [] Progressing: [] Met: [] Not Met: [] Adjusted  2. Patient will demonstrate an increase in NM recruitment/activation and overall GH and scapular strength to within n5lbs HHD or WNL for proper functional mobility as indicated by patients Functional Deficits. [] Progressing: [] Met: [] Not Met: [] Adjusted  3. Patient will return to usual  activities without increased symptoms or restriction. [] Progressing: [] Met: [] Not Met: [] Adjusted    ASSESSMENT:    9-14-22 good tolerance to today's treatment. Instructed in trial of ice vs heat to see if more helpful.   9-26-22  tolerated ex as above with emphasis on keeping resistance/ ROM w/o popping. Feels e-stim more helpful than US. Treatment/Activity Tolerance:  [x] Patient tolerated treatment well [] Patient limited by fatique  [] Patient limited by pain  [] Patient limited by other medical complications  [] Other:     Overall Progression Towards Functional goals/ Treatment Progress Update:  [] Patient is progressing as expected towards functional goals listed. [] Progression is slowed due to complexities/Impairments listed. [] Progression has been slowed due to co-morbidities.   [x] Plan just implemented, too soon to assess goals progression <30days   [] Goals require adjustment due to

## 2022-09-27 ENCOUNTER — APPOINTMENT (OUTPATIENT)
Dept: PHYSICAL THERAPY | Age: 57
End: 2022-09-27
Payer: COMMERCIAL

## 2022-09-28 ENCOUNTER — HOSPITAL ENCOUNTER (OUTPATIENT)
Dept: PHYSICAL THERAPY | Age: 57
Setting detail: THERAPIES SERIES
Discharge: HOME | End: 2022-09-28
Payer: COMMERCIAL

## 2022-09-28 NOTE — FLOWSHEET NOTE
[facility], [facility]  [address] [place], [facility]  Phone: [phone]   Fax:     [phone]    Physical Therapy  Cancellation/No-show Note  Patient Name:  Cody Mraie  :  1965   Date:  2022  Cancelled visits to date: 0  No-shows to date: 1    Patient status for today's appointment patient:  []  Cancelled  []  Rescheduled appointment  [x]  No-show     Reason given by patient:  []  Patient ill  []  Conflicting appointment  []  No transportation    []  Conflict with work  []  No reason given  []  Other:     Comments:      Phone call information:   [x]  Phone call made today to patient at 3:35 _ time at number provided:        []  Patient answered, conversation as follows:    [x]  Patient did not answer, message left as follows:called re NS, left message re: next apt date/ time with instructions to call in advance if unable to attend. Advised if NS again may be D/C.      []  Phone call not made today    Electronically signed by:  Raheel Guido, PTA  [id number]

## 2022-09-29 ENCOUNTER — APPOINTMENT (OUTPATIENT)
Dept: PHYSICAL THERAPY | Age: 57
End: 2022-09-29
Payer: COMMERCIAL

## 2022-10-03 ENCOUNTER — HOSPITAL ENCOUNTER (OUTPATIENT)
Dept: PHYSICAL THERAPY | Age: 57
Setting detail: THERAPIES SERIES
Discharge: HOME | End: 2022-10-03
Payer: COMMERCIAL

## 2022-10-03 NOTE — FLOWSHEET NOTE
[facility], [facility]  [address] [facility] [place], [facility]  Phone: [phone]   Fax:     [phone]    Physical Therapy  Cancellation/No-show Note  Patient Name:  Humaira Lilly  :  1965   Date:  10/03/2022  Cancelled visits to date: 0  No-shows to date: 2    Patient status for today's appointment patient:  []  Cancelled  []  Rescheduled appointment  [x]  No-show     Reason given by patient:  []  Patient ill  []  Conflicting appointment  []  No transportation    []  Conflict with work  []  No reason given  []  Other:     Comments:  Appts taken out    Phone call information:   []  Phone call made today to patient at  _ time at number provided:       []  Patient answered, conversation as follows:   []  Patient did not answer, message left as follows:called re NS, left message re:   []  Phone call not made today    Electronically signed by:  Saray PAEZ#[id number]

## 2022-10-04 ENCOUNTER — APPOINTMENT (OUTPATIENT)
Dept: PHYSICAL THERAPY | Age: 57
End: 2022-10-04
Payer: COMMERCIAL

## 2022-10-05 ENCOUNTER — HOSPITAL ENCOUNTER (OUTPATIENT)
Dept: PHYSICAL THERAPY | Age: 57
Setting detail: THERAPIES SERIES
Discharge: HOME | End: 2022-10-05
Payer: COMMERCIAL

## 2022-10-05 PROCEDURE — G0283 ELEC STIM OTHER THAN WOUND: HCPCS | Performed by: CHIROPRACTOR

## 2022-10-05 PROCEDURE — 97140 MANUAL THERAPY 1/> REGIONS: CPT | Performed by: CHIROPRACTOR

## 2022-10-05 PROCEDURE — 97110 THERAPEUTIC EXERCISES: CPT | Performed by: CHIROPRACTOR

## 2022-10-05 NOTE — FLOWSHEET NOTE
[facility], [facility]  [address] [facility], [facility]  Phone: [phone]   Fax:     [phone]        Physical Therapy Treatment Note/ Progress Report:       Date:  10/05/2022    Patient Name:  Petty Noriega    :  1965  MRN: [id number]    Pertinent Medical History:Additional Pertinent Hx: Anxiety, Depression    Medical/Treatment Diagnosis Information:  Medical Diagnosis: Pain in left shoulder [M25.512]  Other chronic pain [G89.29]  Treatment Diagnosis: shoulder pain, weakness,  limiting ADLs    Insurance/Certification information:  [facility]  Physician Information:  Jeffry Martinez MD  Plan of care signed (Y/N): Inbox    Date of Patient follow up with Physician:  F/U      Progress Report: []  Yes  [x]  No     Date Range for reporting period:  Beginning:  10/5/2022  Ending:      Progress report due (10 Rx/or 30 days whichever is less):      Recertification due (POC duration/ or 90 days whichever is less):      Visit # POC/Insurance Allowable Auth Needed    144 units  22 to 22 [x]Yes    []No     Functional Outcomes Measure:    Date Assessed: at eval  Test: FOTO  Score: 50    Pain level:  Ranges 0-2/10    History of Injury:     Gradual increase of pain over the years due to work/ activity                                       Previous Rot Cuff Repair on R shoulder    SUBJECTIVE:  See eval  22 pain varies according to activity. Using heat at home. 22 - States that pain with activity is increasing. Also c/o increased \"popping\" on anterior aspect of shoulder  22 reports no improvement thus far, possibly a little bit worse with a lot of popping/ grinding. At rest some relief, increases with activity/ certain movements.          OBJECTIVE:   Observation:   Test measurements:      RESTRICTIONS/PRECAUTIONS: sa    Exercises/Interventions:   Therapeutic Ex (04763)  Min: Resistance/Reps Notes/Cues   UBE X 2 min    T/slide   Rows                 Ext                  ER  only Green - 2 x10   B  Green - 2x10   B  Red - 2x10       L      Anterior shoulder \"popping\"with IR        Supine protraction 1# - 2x10   L    Supine horiz add 0#  - 2x10   L     Prone horiz abd 0# - 2x10    L          Therapeutic Activity (32540) Min:  Recommended trial ice at home 10-15 min especially after work Doing ice ~ 2 x / day. NMR re-education (93389)  Min:                    Manual Intervention (05282) Min:      Passive stretch of  L shoulder  X 5 min               Modalities:  Min        E-stim    (supine) IFC with CP x 15 min More helpful than US     Other Therapeutic Activities:  Pt was educated on PT POC, Diagnosis, Prognosis, pathomechanics as well as frequency and duration of scheduling future physical therapy appointments. Time was also taken on this day to answer all patient questions and participation in PT. Reviewed appointment policy in detail with patient and patient verbalized understanding. Home Exercise Program: Patient instructed in the following for HEP:          .   Patient verbalized/demonstrated understanding and was issued written handout. Therapeutic Exercise and NMR EXR  [] (40292) Provided verbal/tactile cueing for activities related to strengthening, flexibility, endurance, ROM  for improvements in scapular, scapulothoracic and UE control with self care, reaching, carrying, lifting, house/yardwork, driving/computer work.    [] (22723) Provided verbal/tactile cueing for activities related to improving balance, coordination, kinesthetic sense, posture, motor skill, proprioception  to assist with  scapular, scapulothoracic and UE control with self care, reaching, carrying, lifting, house/yardwork, driving/computer work.     Therapeutic Activities:    [] (65119 or 90687) Provided verbal/tactile cueing for activities related to improving balance, coordination, kinesthetic sense, posture, motor skill, proprioception and motor activation to allow for proper function of scapular, scapulothoracic and UE control with self care, carrying, lifting, driving/computer work.      Home Exercise Program:    [x] (76257) Reviewed/Progressed HEP activities related to strengthening, flexibility, endurance, ROM of scapular, scapulothoracic and UE control with self care, reaching, carrying, lifting, house/yardwork, driving/computer work  [] (53890) Reviewed/Progressed HEP activities related to improving balance, coordination, kinesthetic sense, posture, motor skill, proprioception of scapular, scapulothoracic and UE control with self care, reaching, carrying, lifting, house/yardwork, driving/computer work      Manual Treatments:  PROM / STM / Oscillations-Mobs:  G-I, II, III, IV (PA's, Inf., Post.)  [] (66893) Provided manual therapy to mobilize soft tissue/joints of cervical/CT, scapular GHJ and UE for the purpose of modulating pain, promoting relaxation,  increasing ROM, reducing/eliminating soft tissue swelling/inflammation/restriction, improving soft tissue extensibility and allowing for proper ROM for normal function with self care, reaching, carrying, lifting, house/yardwork, driving/computer work    Charges:  Timed Code Treatment Minutes: 25   Total Treatment Minutes: 40       [] EVAL (LOW) 50325 (typically 20 minutes face-to-face)  [] EVAL (MOD) 91240 (typically 30 minutes face-to-face)  [] EVAL (HIGH) 77056 (typically 45 minutes face-to-face)  [] RE-EVAL     [x] EB(49607) x   2  [] Dry needle 1 or 2 Muscles (17607)  [] NMR (02364) x     [] Dry needle 3+ Muscles (30597)  [] Manual (24857) x     [] Ultrasound (15163) x  [] TA (30503) x     [] Mech Traction (92044)  [] ES(attended) (28479)     [x] ES (un) (28734):   [] Vasopump (00317) [] Ionto (06865)   [] Other:    I    Approval Dates: 144 units 9-12-22 to 11-30-22  CPT Code Units Approved Units Used  Date Updated: 144 units 9/26/22 - 7               GOALS:  Patient stated goal: Get rid of pain  [] Progressing: [] Met: [] Not Met: [] Adjusted    Therapist goals for Patient:   Short Term Goals: To be achieved in: 2 weeks  1. Independent in HEP and progression per patient tolerance, in order to prevent re-injury. [] Progressing: [] Met: [] Not Met: [] Adjusted  2. Patient will have a decrease in pain to facilitate improvement in movement, function, and ADLs as indicated by Functional Deficits. [] Progressing: [] Met: [] Not Met: [] Adjusted    Long Term Goals: To be achieved in: 6 weeks  1. Increase FOTO functional outcome score from 50 to 64 to assist with reaching prior level of function. [] Progressing: [] Met: [] Not Met: [] Adjusted  2. Patient will demonstrate an increase in NM recruitment/activation and overall GH and scapular strength to within n5lbs HHD or WNL for proper functional mobility as indicated by patients Functional Deficits. [] Progressing: [] Met: [] Not Met: [] Adjusted  3. Patient will return to usual  activities without increased symptoms or restriction. [] Progressing: [] Met: [] Not Met: [] Adjusted    ASSESSMENT:    9-14-22 good tolerance to today's treatment. Instructed in trial of ice vs heat to see if more helpful.   9-26-22  tolerated ex as above with emphasis on keeping resistance/ ROM w/o popping. Feels e-stim more helpful than US. Treatment/Activity Tolerance:  [x] Patient tolerated treatment well [] Patient limited by fatique  [] Patient limited by pain  [] Patient limited by other medical complications  [] Other:     Overall Progression Towards Functional goals/ Treatment Progress Update:  [] Patient is progressing as expected towards functional goals listed. [] Progression is slowed due to complexities/Impairments listed. [] Progression has been slowed due to co-morbidities.   [x] Plan just implemented, too soon to assess goals progression <30days   [] Goals require adjustment due to lack of progress  [] Patient is not progressing as expected and requires additional follow up with physician  [] Other    Prognosis for POC: [x] Good [] Fair  [] Poor    Patient requires continued skilled intervention: [x] Yes  [] No      PLAN: See eval  [] Continue per plan of care [] Alter current plan (see comments)  [x] Plan of care initiated [] Hold pending MD visit [] Discharge    Electronically signed by: Nkechi Srinivasan #[id number]    Note: If patient does not return for scheduled/recommended follow up visits, this note will serve as a discharge from care along with the most recent update on progress.

## 2022-10-06 ENCOUNTER — APPOINTMENT (OUTPATIENT)
Dept: PHYSICAL THERAPY | Age: 57
End: 2022-10-06
Payer: COMMERCIAL

## 2022-10-11 ENCOUNTER — HOSPITAL ENCOUNTER (OUTPATIENT)
Dept: PHYSICAL THERAPY | Age: 57
Setting detail: THERAPIES SERIES
Discharge: HOME | End: 2022-10-11
Payer: COMMERCIAL

## 2022-10-11 ENCOUNTER — APPOINTMENT (OUTPATIENT)
Dept: PHYSICAL THERAPY | Age: 57
End: 2022-10-11
Payer: COMMERCIAL

## 2022-10-11 PROCEDURE — G0283 ELEC STIM OTHER THAN WOUND: HCPCS | Performed by: CHIROPRACTOR

## 2022-10-11 PROCEDURE — 97110 THERAPEUTIC EXERCISES: CPT | Performed by: CHIROPRACTOR

## 2022-10-11 NOTE — FLOWSHEET NOTE
[facility], [facility]  [address] [facility], [facility]  Phone: [phone]   Fax:     [phone]        Physical Therapy Treatment Note/ Progress Report:       Date:  10/11/2022    Patient Name:  Bessie Agarwal    :  1965  MRN: [id number]    Pertinent Medical History:Additional Pertinent Hx: Anxiety, Depression    Medical/Treatment Diagnosis Information:  Medical Diagnosis: Pain in left shoulder [M25.512]  Other chronic pain [G89.29]  Treatment Diagnosis: shoulder pain, weakness,  limiting ADLs    Insurance/Certification information:  [facility]  Physician Information:  Candy Leahy MD  Plan of care signed (Y/N): Inbox    Date of Patient follow up with Physician:  F/U      Progress Report: []  Yes  [x]  No     Date Range for reporting period:  Beginning:  10/11/2022  Ending:      Progress report due (10 Rx/or 30 days whichever is less):      Recertification due (POC duration/ or 90 days whichever is less):      Visit # POC/Insurance Allowable Auth Needed    144 units  22 to 22 [x]Yes    []No     Functional Outcomes Measure:    Date Assessed: at eval  Test: FOTO  Score: 50    Pain level:  Ranges 0-2/10    History of Injury:     Gradual increase of pain over the years due to work/ activity                                       Previous Rot Cuff Repair on R shoulder    SUBJECTIVE:  See eval  22 pain varies according to activity. Using heat at home. 22 - States that pain with activity is increasing. Also c/o increased \"popping\" on anterior aspect of shoulder  22 reports no improvement thus far, possibly a little bit worse with a lot of popping/ grinding. At rest some relief, increases with activity/ certain movements.    10/11/22 - C/o increased\"popping\"        OBJECTIVE:   Observation:   Test measurements:      RESTRICTIONS/PRECAUTIONS: sa    Exercises/Interventions: Therapeutic Ex (51984)  Min: Resistance/Reps Notes/Cues   UBE X 2 min    T/slide   Rows                 Ext                  ER  only Green - 2 x10   B  Green - 2x10   B  Green - 1x10       L  (Minimal \"popping\" also with ER      Anterior shoulder \"popping\"with IR        Supine single arm press 2# - 2x10   L    Supine protraction 1# - 2x10   L    Supine horiz add 0#  - 2x10   L     Prone horiz abd 0# - 2x10    L          Therapeutic Activity (78175) Min:  Recommended trial ice at home 10-15 min especially after work Doing ice ~ 2 x / day. NMR re-education (04086)  Min:                    Manual Intervention (05830) Min:      Passive stretch of  L shoulder  X 5 min               Modalities:  Min        E-stim    (supine) IFC with CP x 15 min More helpful than US     Other Therapeutic Activities:  Pt was educated on PT POC, Diagnosis, Prognosis, pathomechanics as well as frequency and duration of scheduling future physical therapy appointments. Time was also taken on this day to answer all patient questions and participation in PT. Reviewed appointment policy in detail with patient and patient verbalized understanding. Home Exercise Program: Patient instructed in the following for HEP:          .   Patient verbalized/demonstrated understanding and was issued written handout. Therapeutic Exercise and NMR EXR  [] (57470) Provided verbal/tactile cueing for activities related to strengthening, flexibility, endurance, ROM  for improvements in scapular, scapulothoracic and UE control with self care, reaching, carrying, lifting, house/yardwork, driving/computer work.    [] (30810) Provided verbal/tactile cueing for activities related to improving balance, coordination, kinesthetic sense, posture, motor skill, proprioception  to assist with  scapular, scapulothoracic and UE control with self care, reaching, carrying, lifting, house/yardwork, driving/computer work.     Therapeutic Activities: [] (18783 or 06111) Provided verbal/tactile cueing for activities related to improving balance, coordination, kinesthetic sense, posture, motor skill, proprioception and motor activation to allow for proper function of scapular, scapulothoracic and UE control with self care, carrying, lifting, driving/computer work.      Home Exercise Program:    [x] (14953) Reviewed/Progressed HEP activities related to strengthening, flexibility, endurance, ROM of scapular, scapulothoracic and UE control with self care, reaching, carrying, lifting, house/yardwork, driving/computer work  [] (76082) Reviewed/Progressed HEP activities related to improving balance, coordination, kinesthetic sense, posture, motor skill, proprioception of scapular, scapulothoracic and UE control with self care, reaching, carrying, lifting, house/yardwork, driving/computer work      Manual Treatments:  PROM / STM / Oscillations-Mobs:  G-I, II, III, IV (PA's, Inf., Post.)  [] (14291) Provided manual therapy to mobilize soft tissue/joints of cervical/CT, scapular GHJ and UE for the purpose of modulating pain, promoting relaxation,  increasing ROM, reducing/eliminating soft tissue swelling/inflammation/restriction, improving soft tissue extensibility and allowing for proper ROM for normal function with self care, reaching, carrying, lifting, house/yardwork, driving/computer work    Charges:  Timed Code Treatment Minutes: 25   Total Treatment Minutes: 40       [] EVAL (LOW) 42336 (typically 20 minutes face-to-face)  [] EVAL (MOD) 66247 (typically 30 minutes face-to-face)  [] EVAL (HIGH) 13249 (typically 45 minutes face-to-face)  [] RE-EVAL     [x] RH(17364) x   2  [] Dry needle 1 or 2 Muscles (70348)  [] NMR (48257) x     [] Dry needle 3+ Muscles (48476)  [] Manual (27491) x     [] Ultrasound (69561) x  [] TA (36845) x     [] Mech Traction (27127)  [] ES(attended) (02502)     [x] ES (un) (77571):   [] Vasopump (27871) [] Ionto (76527)   [] Other:    I    Approval Dates: 144 units 9-12-22 to 11-30-22  CPT Code Units Approved Units Used  Date Updated:     144 units 9/26/22 - 7               GOALS:  Patient stated goal: Get rid of pain  [] Progressing: [] Met: [] Not Met: [] Adjusted    Therapist goals for Patient:   Short Term Goals: To be achieved in: 2 weeks  1. Independent in HEP and progression per patient tolerance, in order to prevent re-injury. [] Progressing: [] Met: [] Not Met: [] Adjusted  2. Patient will have a decrease in pain to facilitate improvement in movement, function, and ADLs as indicated by Functional Deficits. [] Progressing: [] Met: [] Not Met: [] Adjusted    Long Term Goals: To be achieved in: 6 weeks  1. Increase FOTO functional outcome score from 50 to 64 to assist with reaching prior level of function. [] Progressing: [] Met: [] Not Met: [] Adjusted  2. Patient will demonstrate an increase in NM recruitment/activation and overall GH and scapular strength to within n5lbs HHD or WNL for proper functional mobility as indicated by patients Functional Deficits. [] Progressing: [] Met: [] Not Met: [] Adjusted  3. Patient will return to usual  activities without increased symptoms or restriction. [] Progressing: [] Met: [] Not Met: [] Adjusted    ASSESSMENT:    9-14-22 good tolerance to today's treatment. Instructed in trial of ice vs heat to see if more helpful.   9-26-22  tolerated ex as above with emphasis on keeping resistance/ ROM w/o popping. Feels e-stim more helpful than US. Treatment/Activity Tolerance:  [x] Patient tolerated treatment well [] Patient limited by fatique  [] Patient limited by pain  [] Patient limited by other medical complications  [] Other:     Overall Progression Towards Functional goals/ Treatment Progress Update:  [] Patient is progressing as expected towards functional goals listed. [] Progression is slowed due to complexities/Impairments listed.   [] Progression has been slowed due to co-morbidities. [x] Plan just implemented, too soon to assess goals progression <30days   [] Goals require adjustment due to lack of progress  [] Patient is not progressing as expected and requires additional follow up with physician  [] Other    Prognosis for POC: [x] Good [] Fair  [] Poor    Patient requires continued skilled intervention: [x] Yes  [] No      PLAN: See eval  [] Continue per plan of care [] Alter current plan (see comments)  [x] Plan of care initiated [] Hold pending MD visit [] Discharge    Electronically signed by: Ankit ARREDONDO#[id number]    Note: If patient does not return for scheduled/recommended follow up visits, this note will serve as a discharge from care along with the most recent update on progress.

## 2022-10-24 ENCOUNTER — HOSPITAL ENCOUNTER (OUTPATIENT)
Dept: PHYSICAL THERAPY | Age: 57
Setting detail: THERAPIES SERIES
Discharge: HOME | End: 2022-10-24
Payer: COMMERCIAL

## 2022-10-24 NOTE — FLOWSHEET NOTE
[facility], [facility]  [address] [facility] [place], [facility]  Phone: [phone]   Fax:     [phone]    Physical Therapy  Cancellation/No-show Note  Patient Name:  Smitha Cruz  :  1965   Date:  10/24/2022  Cancelled visits to date: 1  No-shows to date: 2    Patient status for today's appointment patient:  [x]  Cancelled  []  Rescheduled appointment  []  No-show     Reason given by patient:  []  Patient ill  []  Conflicting appointment  []  No transportation    [x]  Conflict with work  []  No reason given  []  Other:     Comments:  reports has to work    Phone call information:   []  Phone call made today to patient at  _ time at number provided:       []  Patient answered, conversation as follows:   []  Patient did not answer, message left as follows:called re NS, left message re:     [x]  Phone call not made today    Electronically signed by:  [place], PTA  [id number]

## 2022-10-26 ENCOUNTER — HOSPITAL ENCOUNTER (OUTPATIENT)
Dept: PHYSICAL THERAPY | Age: 57
Setting detail: THERAPIES SERIES
Discharge: HOME | End: 2022-10-26
Payer: COMMERCIAL

## 2022-10-26 NOTE — FLOWSHEET NOTE
[facility], [facility]  [address] [facility] [place], [facility]  Phone: [phone]   Fax:     [phone]    Physical Therapy  Cancellation/No-show Note  Patient Name:  Regan Parham  :  1965   Date:  10/26/2022  Cancelled visits to date: 2  No-shows to date: 2    Patient status for today's appointment patient:  [x]  Cancelled  []  Rescheduled appointment  []  No-show     Reason given by patient:  []  Patient ill  []  Conflicting appointment  []  No transportation    []  Conflict with work  []  No reason given  [x]  Other:     Comments:  Showed up at the wrong time and was unable to be seen, but said she would return at her regular time.                                       She did not return    Phone call information:   []  Phone call made today to patient at  _ time at number provided:       []  Patient answered, conversation as follows:   []  Patient did not answer, message left as follows:called re NS, left message re:     [x]  Phone call not made today    Electronically signed by:  Corrina Melendez  #[id number]

## 2022-10-31 ENCOUNTER — HOSPITAL ENCOUNTER (OUTPATIENT)
Dept: PHYSICAL THERAPY | Age: 57
Setting detail: THERAPIES SERIES
Discharge: HOME | End: 2022-10-31
Payer: COMMERCIAL

## 2022-10-31 PROCEDURE — 97110 THERAPEUTIC EXERCISES: CPT

## 2022-10-31 PROCEDURE — G0283 ELEC STIM OTHER THAN WOUND: HCPCS

## 2022-10-31 NOTE — FLOWSHEET NOTE
[facility], [facility]  [address] [facility], [facility]  Phone: [phone]   Fax:     [phone]        Physical Therapy Treatment Note/ Progress Report:       Date:  10/31/2022    Patient Name:  Ted Manning    :  1965  MRN: [id number]    Pertinent Medical History:Additional Pertinent Hx: Anxiety, Depression    Medical/Treatment Diagnosis Information:  Medical Diagnosis: Pain in left shoulder [M25.512]  Other chronic pain [G89.29]  Treatment Diagnosis: shoulder pain, weakness,  limiting ADLs    Insurance/Certification information:  [facility]  Physician Information:  Tuyet Poole MD  Plan of care signed (Y/N): Inbox    Date of Patient follow up with Physician:  F/U      Progress Report: []  Yes  [x]  No     Date Range for reporting period:  Beginning:  10/31/2022  Ending:      Progress report due (10 Rx/or 30 days whichever is less):      Recertification due (POC duration/ or 90 days whichever is less):      Visit # POC/Insurance Allowable Auth Needed    144 units  22 to 22 [x]Yes    []No     Functional Outcomes Measure:    Date Assessed: at eval  Test: FOTO  Score: 50    Pain level:  Ranges 0-2/10    History of Injury:     Gradual increase of pain over the years due to work/ activity                                       Previous Rot Cuff Repair on R shoulder    SUBJECTIVE:  See eval  22 pain varies according to activity. Using heat at home. 22 - States that pain with activity is increasing. Also c/o increased \"popping\" on anterior aspect of shoulder  22 reports no improvement thus far, possibly a little bit worse with a lot of popping/ grinding. At rest some relief, increases with activity/ certain movements. 10/11/22 - C/o increased\"popping\"  10-31-22 reports feels shld getting worse, regrets not getting injection.   With certain movements pain can increase to a a 10/10. OBJECTIVE:   Observation:   Test measurements:      RESTRICTIONS/PRECAUTIONS: sa    Exercises/Interventions:   Therapeutic Ex (37978)  Min: Resistance/Reps Notes/Cues   UBE X 2 min    T/slide   Rows                 Ext                  ER  only Green - 2 x10   B  Green - 2x10   B  Green - 1x10       L  (Minimal \"popping\" also with ER      Anterior shoulder \"popping\"with IR        Supine single arm press 2# - 2x10   L    Supine protraction 1# - 2x10   L    Supine horiz add 0#  - 2x10   L     Prone horiz abd 0# - 2x10    L          Therapeutic Activity (59715) Min:  Recommended trial ice at home 10-15 min especially after work Doing ice ~ 2 x / day. NMR re-education (76844)  Min:                    Manual Intervention (21275) Min:      Passive stretch of  L shoulder  X 5 min  Increased pain at end range flex, and with horz add. Modalities:  Min        E-stim    (supine) IFC with CP x 15 min More helpful than US     Other Therapeutic Activities:  Pt was educated on PT POC, Diagnosis, Prognosis, pathomechanics as well as frequency and duration of scheduling future physical therapy appointments. Time was also taken on this day to answer all patient questions and participation in PT. Reviewed appointment policy in detail with patient and patient verbalized understanding. Home Exercise Program: Patient instructed in the following for HEP:          .   Patient verbalized/demonstrated understanding and was issued written handout.       Therapeutic Exercise and NMR EXR  [] (92041) Provided verbal/tactile cueing for activities related to strengthening, flexibility, endurance, ROM  for improvements in scapular, scapulothoracic and UE control with self care, reaching, carrying, lifting, house/yardwork, driving/computer work.    [] (94268) Provided verbal/tactile cueing for activities related to improving balance, coordination, kinesthetic sense, posture, motor skill, proprioception  to assist with  scapular, scapulothoracic and UE control with self care, reaching, carrying, lifting, house/yardwork, driving/computer work. Therapeutic Activities:    [] (08241 or 02245) Provided verbal/tactile cueing for activities related to improving balance, coordination, kinesthetic sense, posture, motor skill, proprioception and motor activation to allow for proper function of scapular, scapulothoracic and UE control with self care, carrying, lifting, driving/computer work.      Home Exercise Program:    [x] (97958) Reviewed/Progressed HEP activities related to strengthening, flexibility, endurance, ROM of scapular, scapulothoracic and UE control with self care, reaching, carrying, lifting, house/yardwork, driving/computer work  [] (26595) Reviewed/Progressed HEP activities related to improving balance, coordination, kinesthetic sense, posture, motor skill, proprioception of scapular, scapulothoracic and UE control with self care, reaching, carrying, lifting, house/yardwork, driving/computer work      Manual Treatments:  PROM / STM / Oscillations-Mobs:  G-I, II, III, IV (PA's, Inf., Post.)  [] (20676) Provided manual therapy to mobilize soft tissue/joints of cervical/CT, scapular GHJ and UE for the purpose of modulating pain, promoting relaxation,  increasing ROM, reducing/eliminating soft tissue swelling/inflammation/restriction, improving soft tissue extensibility and allowing for proper ROM for normal function with self care, reaching, carrying, lifting, house/yardwork, driving/computer work    Charges:  Timed Code Treatment Minutes: 25   Total Treatment Minutes: 40       [] EVAL (LOW) 62347 (typically 20 minutes face-to-face)  [] EVAL (MOD) 10324 (typically 30 minutes face-to-face)  [] EVAL (HIGH) 93577 (typically 45 minutes face-to-face)  [] RE-EVAL     [x] ZT(47589) x   2  [] Dry needle 1 or 2 Muscles (77286)  [] NMR (50154) x     [] Dry needle 3+ Muscles (84269)  [] Manual (93149) x [] Ultrasound (56517) x  [] TA (66618) x     [] Mech Traction (37923)  [] ES(attended) (50200)     [x] ES (un) (45063):   [] Vasopump (47337) [] Ionto (08048)   [] Other:    I    Approval Dates: 144 units 9-12-22 to 11-30-22  CPT Code Units Approved Units Used  Date Updated:     144 units 9/26/22 - 7               GOALS:  Patient stated goal: Get rid of pain  [] Progressing: [] Met: [] Not Met: [] Adjusted    Therapist goals for Patient:   Short Term Goals: To be achieved in: 2 weeks  1. Independent in HEP and progression per patient tolerance, in order to prevent re-injury. [] Progressing: [] Met: [] Not Met: [] Adjusted  2. Patient will have a decrease in pain to facilitate improvement in movement, function, and ADLs as indicated by Functional Deficits. [] Progressing: [] Met: [] Not Met: [] Adjusted    Long Term Goals: To be achieved in: 6 weeks  1. Increase FOTO functional outcome score from 50 to 64 to assist with reaching prior level of function. [] Progressing: [] Met: [] Not Met: [] Adjusted  2. Patient will demonstrate an increase in NM recruitment/activation and overall GH and scapular strength to within n5lbs HHD or WNL for proper functional mobility as indicated by patients Functional Deficits. [] Progressing: [] Met: [] Not Met: [] Adjusted  3. Patient will return to usual  activities without increased symptoms or restriction. [] Progressing: [] Met: [] Not Met: [] Adjusted    ASSESSMENT:    9-14-22 good tolerance to today's treatment. Instructed in trial of ice vs heat to see if more helpful.   9-26-22  tolerated ex as above with emphasis on keeping resistance/ ROM w/o popping. Feels e-stim more helpful than US. 10-31-22 increased pain with horizontal abd.      Treatment/Activity Tolerance:  [x] Patient tolerated treatment well [] Patient limited by fatique  [] Patient limited by pain  [] Patient limited by other medical complications  [] Other:     Overall Progression Towards Functional goals/ Treatment Progress Update:  [] Patient is progressing as expected towards functional goals listed. [] Progression is slowed due to complexities/Impairments listed. [] Progression has been slowed due to co-morbidities. [x] Plan just implemented, too soon to assess goals progression <30days   [] Goals require adjustment due to lack of progress  [] Patient is not progressing as expected and requires additional follow up with physician  [] Other    Prognosis for POC: [x] Good [] Fair  [] Poor    Patient requires continued skilled intervention: [x] Yes  [] No      PLAN: See eval  [] Continue per plan of care [] Alter current plan (see comments)  [x] Plan of care initiated [] Hold pending MD visit [] Discharge    Electronically signed by: Camilo Gaona,      Note: If patient does not return for scheduled/recommended follow up visits, this note will serve as a discharge from care along with the most recent update on progress.

## 2022-11-02 ENCOUNTER — HOSPITAL ENCOUNTER (OUTPATIENT)
Dept: PHYSICAL THERAPY | Age: 57
Setting detail: THERAPIES SERIES
Discharge: HOME | End: 2022-11-02
Payer: COMMERCIAL

## 2022-11-02 PROCEDURE — G0283 ELEC STIM OTHER THAN WOUND: HCPCS | Performed by: CHIROPRACTOR

## 2022-11-02 PROCEDURE — 97110 THERAPEUTIC EXERCISES: CPT | Performed by: CHIROPRACTOR

## 2022-11-02 NOTE — FLOWSHEET NOTE
[facility], [facility]  [address] [facility] [place], [facility]  Phone: [phone]   Fax:     [phone]        Physical Therapy Treatment Note/ Progress Report:       Date:  2022    Patient Name:  Murali Christina    :  1965  MRN: [id number]    Pertinent Medical History:Additional Pertinent Hx: Anxiety, Depression    Medical/Treatment Diagnosis Information:  Medical Diagnosis: Pain in left shoulder [M25.512]  Other chronic pain [G89.29]  Treatment Diagnosis: shoulder pain, weakness,  limiting ADLs    Insurance/Certification information:  [facility]  Physician Information:  Robert Mcgill MD  Plan of care signed (Y/N): Inbox    Date of Patient follow up with Physician:  F/U      Progress Report: []  Yes  [x]  No     Date Range for reporting period:  Beginnin2022  Ending:      Progress report due (10 Rx/or 30 days whichever is less):      Recertification due (POC duration/ or 90 days whichever is less):      Visit # POC/Insurance Allowable Auth Needed    144 units  22 to 22 [x]Yes    []No     Functional Outcomes Measure:    Date Assessed: at eval  Test: FOTO  Score: 50    Pain level:  Ranges 0-10/10    History of Injury:     Gradual increase of pain over the years due to work/ activity                                       Previous Rot Cuff Repair on R shoulder    SUBJECTIVE:  See eval  22 pain varies according to activity. Using heat at home. 22 - States that pain with activity is increasing. Also c/o increased \"popping\" on anterior aspect of shoulder  22 reports no improvement thus far, possibly a little bit worse with a lot of popping/ grinding. At rest some relief, increases with activity/ certain movements. 10/11/22 - C/o increased\"popping\"  10-31-22 reports feels shld getting worse, regrets not getting injection.   With certain movements pain can increase to a a 10/10.   11/2/22 - Feels intermittent popping / grinding      OBJECTIVE:   Observation:   Test measurements:      RESTRICTIONS/PRECAUTIONS:     Exercises/Interventions:   Therapeutic Ex (46796)  Min: Resistance/Reps Notes/Cues   UBE X 2 min    T/slide   Rows                 Ext                  ER  only Green - 2 x10   B  Green - 2x10   B  (Minimal \"popping\" also with ER      Anterior shoulder \"popping\"with IR        Supine single arm press 2# - 2x10   L    Supine protraction 2# - 2x10   L    Supine horiz add 0#  - 2x10   L     Prone horiz abd 1# - 2x10    L          Therapeutic Activity (99037) Min:  Recommended trial ice at home 10-15 min especially after work Doing ice ~ 2 x / day. NMR re-education (77503)  Min:                    Manual Intervention (35207) Min:      Passive stretch of  L shoulder  X 5 min  Increased pain at end range flex, and with horz add. Modalities:  Min        E-stim    (supine) IFC with CP x 15 min More helpful than US     Other Therapeutic Activities:  Pt was educated on PT POC, Diagnosis, Prognosis, pathomechanics as well as frequency and duration of scheduling future physical therapy appointments. Time was also taken on this day to answer all patient questions and participation in PT. Reviewed appointment policy in detail with patient and patient verbalized understanding. Home Exercise Program: Patient instructed in the following for HEP:          .   Patient verbalized/demonstrated understanding and was issued written handout.       Therapeutic Exercise and NMR EXR  [] (95906) Provided verbal/tactile cueing for activities related to strengthening, flexibility, endurance, ROM  for improvements in scapular, scapulothoracic and UE control with self care, reaching, carrying, lifting, house/yardwork, driving/computer work.    [] (84471) Provided verbal/tactile cueing for activities related to improving balance, coordination, kinesthetic sense, posture, motor skill, proprioception  to assist with  scapular, scapulothoracic and UE control with self care, reaching, carrying, lifting, house/yardwork, driving/computer work. Therapeutic Activities:    [] (37002 or 48817) Provided verbal/tactile cueing for activities related to improving balance, coordination, kinesthetic sense, posture, motor skill, proprioception and motor activation to allow for proper function of scapular, scapulothoracic and UE control with self care, carrying, lifting, driving/computer work.      Home Exercise Program:    [x] (37752) Reviewed/Progressed HEP activities related to strengthening, flexibility, endurance, ROM of scapular, scapulothoracic and UE control with self care, reaching, carrying, lifting, house/yardwork, driving/computer work  [] (55000) Reviewed/Progressed HEP activities related to improving balance, coordination, kinesthetic sense, posture, motor skill, proprioception of scapular, scapulothoracic and UE control with self care, reaching, carrying, lifting, house/yardwork, driving/computer work      Manual Treatments:  PROM / STM / Oscillations-Mobs:  G-I, II, III, IV (PA's, Inf., Post.)  [] (27264) Provided manual therapy to mobilize soft tissue/joints of cervical/CT, scapular GHJ and UE for the purpose of modulating pain, promoting relaxation,  increasing ROM, reducing/eliminating soft tissue swelling/inflammation/restriction, improving soft tissue extensibility and allowing for proper ROM for normal function with self care, reaching, carrying, lifting, house/yardwork, driving/computer work    Charges:  Timed Code Treatment Minutes: 25   Total Treatment Minutes: 40       [] EVAL (LOW) 92051 (typically 20 minutes face-to-face)  [] EVAL (MOD) 80388 (typically 30 minutes face-to-face)  [] EVAL (HIGH) 13411 (typically 45 minutes face-to-face)  [] RE-EVAL     [x] NN(09912) x   2  [] Dry needle 1 or 2 Muscles (42999)  [] NMR (43156) x     [] Dry needle 3+ Muscles (32786)  [] Manual (25660) x     [] Ultrasound (21648) x  [] TA (31107) x     [] Mech Traction (77974)  [] ES(attended) (99253)     [x] ES (un) (97389):   [] Vasopump (17476) [] Ionto (94905)   [] Other:    I    Approval Dates: 144 units 9-12-22 to 11-30-22  CPT Code Units Approved Units Used  Date Updated:     144 units 9/26/22 - 7               GOALS:  Patient stated goal: Get rid of pain  [] Progressing: [] Met: [] Not Met: [] Adjusted    Therapist goals for Patient:   Short Term Goals: To be achieved in: 2 weeks  1. Independent in HEP and progression per patient tolerance, in order to prevent re-injury. [] Progressing: [] Met: [] Not Met: [] Adjusted  2. Patient will have a decrease in pain to facilitate improvement in movement, function, and ADLs as indicated by Functional Deficits. [] Progressing: [] Met: [] Not Met: [] Adjusted    Long Term Goals: To be achieved in: 6 weeks  1. Increase FOTO functional outcome score from 50 to 64 to assist with reaching prior level of function. [] Progressing: [] Met: [] Not Met: [] Adjusted  2. Patient will demonstrate an increase in NM recruitment/activation and overall GH and scapular strength to within n5lbs HHD or WNL for proper functional mobility as indicated by patients Functional Deficits. [] Progressing: [] Met: [] Not Met: [] Adjusted  3. Patient will return to usual  activities without increased symptoms or restriction. [] Progressing: [] Met: [] Not Met: [] Adjusted    ASSESSMENT:    9-14-22 good tolerance to today's treatment. Instructed in trial of ice vs heat to see if more helpful.   9-26-22  tolerated ex as above with emphasis on keeping resistance/ ROM w/o popping. Feels e-stim more helpful than US. 10-31-22 increased pain with horizontal abd.      Treatment/Activity Tolerance:  [x] Patient tolerated treatment well [] Patient limited by fatique  [] Patient limited by pain  [] Patient limited by other medical complications  [] Other:     Overall Progression Towards Functional goals/ Treatment Progress Update:  [] Patient is progressing as expected towards functional goals listed. [] Progression is slowed due to complexities/Impairments listed. [] Progression has been slowed due to co-morbidities. [x] Plan just implemented, too soon to assess goals progression <30days   [] Goals require adjustment due to lack of progress  [] Patient is not progressing as expected and requires additional follow up with physician  [] Other    Prognosis for POC: [x] Good [] Fair  [] Poor    Patient requires continued skilled intervention: [x] Yes  [] No      PLAN: See eval  [] Continue per plan of care [] Alter current plan (see comments)  [x] Plan of care initiated [] Hold pending MD visit [] Discharge    Electronically signed by: Saray Cornejo #[id number]    Note: If patient does not return for scheduled/recommended follow up visits, this note will serve as a discharge from care along with the most recent update on progress.

## 2022-11-08 ENCOUNTER — HOSPITAL ENCOUNTER (OUTPATIENT)
Dept: PHYSICAL THERAPY | Age: 57
Setting detail: THERAPIES SERIES
Discharge: HOME | End: 2022-11-08
Payer: COMMERCIAL

## 2022-11-08 NOTE — FLOWSHEET NOTE
[facility], [facility]  [address] [facility] [place], [facility]  Phone: [phone]   Fax:     [phone]    Physical Therapy  Cancellation/No-show Note  Patient Name:  Meeat Sorenson  :  1965   Date:  2022  Cancelled visits to date: 1  No-shows to date: 3    Patient status for today's appointment patient:  []  Cancelled  []  Rescheduled appointment  [x]  No-show     Reason given by patient:  []  Patient ill  []  Conflicting appointment  []  No transportation    []  Conflict with work  []  No reason given  []  Other:     Comments:     Phone call information:   []  Phone call made today to patient at  _ time at number provided:       []  Patient answered, conversation as follows:   []  Patient did not answer, message left as follows:called re NS, left message re:     []  Phone call not made today    Electronically signed by:  Neetu MUÑOZ#[id number]

## 2022-11-11 ENCOUNTER — HOSPITAL ENCOUNTER (OUTPATIENT)
Dept: PHYSICAL THERAPY | Age: 57
Setting detail: THERAPIES SERIES
Discharge: HOME | End: 2022-11-11
Payer: COMMERCIAL

## 2022-11-11 NOTE — FLOWSHEET NOTE
[facility], [facility]  [address] [facility] [place], [facility]  Phone: [phone]   Fax:     [phone]    Physical Therapy  Cancellation/No-show Note  Patient Name:  Kyra Da Silva  :  1965   Date:  2022  Cancelled visits to date: 1  No-shows to date: 3    Patient status for today's appointment patient:  []  Cancelled  []  Rescheduled appointment  [x]  No-show     Reason given by patient:  []  Patient ill  []  Conflicting appointment  []  No transportation    []  Conflict with work  []  No reason given  [x]  Other:     Comments: No more visits scheduled    Phone call information:   []  Phone call made today to patient at  _ time at number provided:       []  Patient answered, conversation as follows:   []  Patient did not answer, message left as follows:called re NS, left message re:     []  Phone call not made today    Electronically signed by:  Morteza Mendez  #[id number]

## 2022-11-15 ENCOUNTER — TELEPHONE (OUTPATIENT)
Dept: ORTHOPEDIC SURGERY | Age: 57
End: 2022-11-15

## 2022-11-15 DIAGNOSIS — G89.29 CHRONIC LEFT SHOULDER PAIN: Primary | ICD-10-CM

## 2022-11-15 DIAGNOSIS — M25.512 CHRONIC LEFT SHOULDER PAIN: Primary | ICD-10-CM

## 2022-11-15 NOTE — TELEPHONE ENCOUNTER
Carlee attended 8 visits of formal, supervised physical therapy. She continues to complain of 10/10 pain with intermittent popping/grinding. She complains of increased symptoms. Carlee continues to have pain with certain physical therapy exercises despite consistency with plan.

## 2022-11-15 NOTE — TELEPHONE ENCOUNTER
JACKSON/VALDEMAR ARGUETA  Based on review of her PT notes, Dr. Felix Espitia would like to offer to cx her appointment this afternoon and proceed with MRI ordering and follow up after the MRI. Requested she call the office to advise if she wishes to proceed this way. Radha Wilfredo called the office and wishes to proceed with MRI as today's appointment was cancelled. MRI order placed and sent for prior authorization; patient to be contacted once approved by [facility].

## 2022-11-16 ENCOUNTER — TELEPHONE (OUTPATIENT)
Dept: ORTHOPEDIC SURGERY | Age: 57
End: 2022-11-16

## 2022-11-16 NOTE — TELEPHONE ENCOUNTER
Faxed order, authorization and demographics to [facility]. Will call patient to notify once fax confirmation is received.

## 2022-11-16 NOTE — TELEPHONE ENCOUNTER
Spoke with patient. Advised of MRI authorization for [facility].  She will call to schedule and return to office approximately three days later

## 2022-12-13 ENCOUNTER — HOSPITAL ENCOUNTER (OUTPATIENT)
Dept: WOMENS IMAGING | Age: 57
Discharge: HOME | End: 2022-12-13
Payer: COMMERCIAL

## 2022-12-13 ENCOUNTER — TELEPHONE (OUTPATIENT)
Dept: ORTHOPEDIC SURGERY | Age: 57
End: 2022-12-13

## 2022-12-13 DIAGNOSIS — Z12.31 BREAST CANCER SCREENING BY MAMMOGRAM: ICD-10-CM

## 2022-12-13 PROCEDURE — 77063 BREAST TOMOSYNTHESIS BI: CPT

## 2022-12-13 NOTE — TELEPHONE ENCOUNTER
FROY ARGUETA  Requested she schedule an appointment with Dr. Eugenio Berg to review her MRI results and treatment options. Upon call back, please schedule an appointment with Dr. Eugenio Berg.

## 2022-12-20 ENCOUNTER — TELEPHONE (OUTPATIENT)
Dept: ORTHOPEDIC SURGERY | Age: 57
End: 2022-12-20

## 2022-12-20 NOTE — TELEPHONE ENCOUNTER
Same Day Appt CX    Appointment time:  1:30 PM       SAME DAY APPT CX. DUE TO WORK ISSUES .  WILL CALL BACK TO RESCHEDULE

## 2023-01-17 ENCOUNTER — OFFICE VISIT (OUTPATIENT)
Dept: ORTHOPEDIC SURGERY | Age: 58
End: 2023-01-17
Payer: COMMERCIAL

## 2023-01-17 VITALS — WEIGHT: 145 LBS | RESPIRATION RATE: 16 BRPM | BODY MASS INDEX: 25.69 KG/M2 | HEIGHT: 63 IN

## 2023-01-17 DIAGNOSIS — M75.122 NONTRAUMATIC COMPLETE TEAR OF LEFT ROTATOR CUFF: Primary | ICD-10-CM

## 2023-01-17 PROCEDURE — G8427 DOCREV CUR MEDS BY ELIG CLIN: HCPCS | Performed by: ORTHOPAEDIC SURGERY

## 2023-01-17 PROCEDURE — G8484 FLU IMMUNIZE NO ADMIN: HCPCS | Performed by: ORTHOPAEDIC SURGERY

## 2023-01-17 PROCEDURE — 3017F COLORECTAL CA SCREEN DOC REV: CPT | Performed by: ORTHOPAEDIC SURGERY

## 2023-01-17 PROCEDURE — G8419 CALC BMI OUT NRM PARAM NOF/U: HCPCS | Performed by: ORTHOPAEDIC SURGERY

## 2023-01-17 PROCEDURE — 1036F TOBACCO NON-USER: CPT | Performed by: ORTHOPAEDIC SURGERY

## 2023-01-17 PROCEDURE — 99214 OFFICE O/P EST MOD 30 MIN: CPT | Performed by: ORTHOPAEDIC SURGERY

## 2023-01-17 NOTE — LETTER
Surgery Scheduling Form:    Patient Name:  Les Rodriguez  Patient :  1965     Patient MR#:  [id number]    Patient Address:  [address] [place] [address]    Surgeon:  Miguel Ryan M.D.    PCP:  LAURA Rios CNP  Insurance:  Payor: Anish Pizarro / Plan: Lenora Ulrich / Product Type: *No Product type* /       Diagnosis:  Left shoulder rotator cuff tear    Operation:  Left shoulder arthroscopy, subacromial decompression, rotator cuff repair. [id number]  Surgeon's Scheduling Instruction:  elective  Requested Date:     OR Time:       Patient Arrival Time:    OR Time Required:  76  Minutes    Anesthesia:  General + block  Surgical Assistant required:  Yes   Equipment: Arthrex  Standard C-Arm:  No    Mini C-Arm:  No  Status:  Outpatient   PAT Required:  Yes    Comments:   History and Physical: Patient will obtain H+P from PCP prior to surgery  Covid test: {kwcovid:27734}              Miguel Ryan MD      23 3:37 PM EST

## 2023-01-19 ENCOUNTER — TELEPHONE (OUTPATIENT)
Dept: ORTHOPEDIC SURGERY | Age: 58
End: 2023-01-19

## 2023-01-19 NOTE — TELEPHONE ENCOUNTER
JACKSON/VALDEMAR ARGUETA  Reaching out regarding surgery that was discussed during OV on 1.17.2023. She may call the office or send a [email or web address] message when she is ready to schedule.

## 2023-02-07 ENCOUNTER — TELEPHONE (OUTPATIENT)
Dept: ORTHOPEDIC SURGERY | Age: 58
End: 2023-02-07

## 2023-02-07 NOTE — TELEPHONE ENCOUNTER
Surgery Scheduling     Contact Name: Rosa Cobb Request: LT [facility]  Patient Contact Number: [phone]    PATIENT IS REQ A RETURN CALL TO SCHEDULE HER LT SHLDR SX.    PLEASE RETURN CALL TO THE ABOVE NUMBER.

## 2023-02-08 NOTE — TELEPHONE ENCOUNTER
JACKSON/M EVLER ARGUETA  Returning call regarding scheduling. She may reach out via phone or "[employer]hart with requested dates. Upon patient call back, please provides dates or timeframe Angeles Solomon is wishing to schedule surgery.

## 2023-02-08 NOTE — TELEPHONE ENCOUNTER
JACKSON/M ELVER ARGUETA  Returning call regarding scheduling. She may reach out via phone or [employer] with requested dates. Upon patient call back, please provides dates or timeframe Rick Thomas is wishing to schedule surgery.

## 2023-02-17 ENCOUNTER — TELEPHONE (OUTPATIENT)
Dept: ORTHOPEDIC SURGERY | Age: 58
End: 2023-02-17

## 2023-02-17 NOTE — TELEPHONE ENCOUNTER
Surgery and/or Procedure Scheduling     Contact Name: Yao Ahumada Request: JACKSON SHOULDER RCR   Patient Contact Number: [phone]

## 2023-02-17 NOTE — TELEPHONE ENCOUNTER
First call rang for nearly 2 minutes before a busy tone came on. 2nd Attempt S/W ELLIE   Request to schedule surgery received. She does wish to schedule for 3/17/2023 at [facility]. All pertinent appointments were scheduled with the patient, and pre procedure instructions were reviewed as well. The patient's updated surgery packet will be sent via [employer] portal per our discussion. Vani Kumari was asked to thoroughly review the packet and contact the office via phone or [place] with any questions. The patient was made aware the at this time, covid testing is not required, unless symptoms develop. Patient advised that should they develop any symptoms of sickness within 7-10 days of surgery date, they are to contact the office. The patient was advised that a preop H+P will need to be completed with heir PCP within 30 days of their scheduled surgery date. Medication list was reviewed and updated with the patient. Patient voiced understanding of the conversation and will contact the office with further questions or concerns.

## 2023-02-22 ENCOUNTER — TELEPHONE (OUTPATIENT)
Dept: ORTHOPEDIC SURGERY | Age: 58
End: 2023-02-22

## 2023-02-22 DIAGNOSIS — M75.122 NONTRAUMATIC COMPLETE TEAR OF LEFT ROTATOR CUFF: Primary | ICD-10-CM

## 2023-02-22 RX ORDER — MELOXICAM 7.5 MG/1
7.5 TABLET ORAL DAILY
Qty: 30 TABLET | Refills: 3 | Status: SHIPPED | OUTPATIENT
Start: 2023-02-22

## 2023-02-22 NOTE — TELEPHONE ENCOUNTER
L/M FOR ELLIE Cardoza sent by Mohini Garza to [facility] on [address] [facility] should contact her once available for p/u, confirmed receipt this morning. She would need to stop the Mobic and other NSAIDs 1 week prior to surgery. She can also take Tylenol as needed. Apply ice to the shoulder as needed. Patient was instructed to contact the office with further questions or concerns.

## 2023-02-22 NOTE — TELEPHONE ENCOUNTER
Prescription Refill     Medication Name:  PAIN MEDICATION  Pharmacy: [id number] [facility], [address] [facility] [phone] John Vance [phone]  Patient Contact Number:  [phone]    PATIENT REQUESTING PAIN MEDICATION UNTIL HER SCHEDULE SX. PLS CALL TO ADVISE [phone]

## 2023-02-22 NOTE — TELEPHONE ENCOUNTER
Patient scheduled for surgery 3.17.2023; please advise on appropriateness of request for medication.

## 2023-02-22 NOTE — TELEPHONE ENCOUNTER
Prescription for Mobic E scribed. She would need to stop the Mobic and other NSAIDs 1 week prior to surgery. She can also take Tylenol as needed. Apply ice to the shoulder as needed.

## 2023-02-27 ENCOUNTER — TELEPHONE (OUTPATIENT)
Dept: ORTHOPEDIC SURGERY | Age: 58
End: 2023-02-27

## 2023-03-01 NOTE — TELEPHONE ENCOUNTER
Auth: # [id number]    Date: 03/17/23 thru 04/16/23  Type of SX:  Outpatient  Location: [facility]  CPT: 75096, 59257   DX Code: M75.122  SX area: [facility]  Insurance: [facility]

## 2023-03-09 NOTE — PROGRESS NOTES
Elizabeth Porras  :  1965    DOS:  3/17/23    medical clearance per Dr Cate Uribe office # [phone] to be done on 3/13/23    UPDATE: 3/14/23 H&P scanned into media. Medically cleared.

## 2023-03-09 NOTE — PROGRESS NOTES
[address] [facility] time ____0740________        Surgery time _____0940_______    Take the following medications with a sip of water: Follow your MD/Surgeons pre-procedure instructions regarding your medications     Do not eat or drink anything after 12:00 midnight prior to your surgery. This includes water chewing gum, mints and ice chips. You may brush your teeth and gargle the morning of your surgery, but do not swallow the water     Please see your family doctor/pediatrician for a history and physical and/or concerning medications. Bring any test results/reports from your physicians office. If you are under the care of a heart doctor or specialist doctor, please be aware that you may be asked to them for clearance    You may be asked to stop blood thinners such as Coumadin, Plavix, Fragmin, Lovenox, etc., or any anti-inflammatories such as:  Aspirin, Ibuprofen, Advil, Naproxen prior to your surgery. We also ask that you stop any OTC medications such as fish oil, vitamin E, glucosamine, garlic, Multivitamins, COQ 10, etc.    We ask that you do not smoke 24 hours prior to surgery  We ask that you do not  drink any alcoholic beverages 24 hours prior to surgery     You must make arrangements for a responsible adult to take you home after your surgery. For your safety you will not be allowed to leave alone or drive yourself home. Your surgery will be cancelled if you do not have a ride home. Also for your safety, it is strongly suggested that someone stay with you the first 24 hours after your surgery. A parent or legal guardian must accompany a child scheduled for surgery and plan to stay at the hospital until the child is discharged. Please do not bring other children with you. For your comfort, please wear simple loose fitting clothing to the hospital.  Please do not bring valuables.     Do not wear any make-up or nail polish on your fingers or toes      For your safety, please do not wear any jewelry or body piercing's on the day of surgery. All jewelry must be removed. If you have dentures, they will be removed before going to operating room. For your convenience, we will provide you with a container. If you wear contact lenses or glasses, they will be removed, please bring a case for them. If you have a living will and a durable power of  for healthcare, please bring in a copy. As part of our patient safety program to minimize surgical site infections, we ask you to do the following:    Please notify your surgeon if you develop any illness between         now and the  day of your surgery. This includes a cough, cold, fever, sore throat, nausea,         or vomiting, and diarrhea, etc.   Please notify your surgeon if you experience dizziness, shortness         of breath or blurred vision between now and the time of your surgery. Do not shave your operative site 96 hours prior to surgery. For face and neck surgery, men may use an electric razor 48 hours   prior to surgery. You may shower the night before surgery or the morning of   your surgery with an antibacterial soap. You will need to bring a photo ID and insurance card    [facility] has an onsite pharmacy, would you like to utilize our pharmacy     If you will be staying overnight and use a C-pap machine, please bring   your C-pap to hospital     Our goal is to provide you with excellent care, therefore, visitors will be limited to two(2) in the room at a time so that we may focus on providing this care for you. Please contact pre-admission testing if you have any further questions. [facility] phone number:  [phone] PAT fax number:  [phone]  Please note these are generalized instructions for all surgical cases, you may be provided with more specific instructions according to your surgery.     C-Difficile admission screening and protocol:       * Admitted with diarrhea? [] YES    [x]  NO     *Prior history of C-Diff. In last 3 months? [] YES    [x]  NO     *Antibiotic use in the past 6-8 weeks? [x]  NO    []  YES                 If yes, which ANTIBIOTIC AND REASON______     *Prior hospitalization or nursing home in the last month? []  YES    [x]  NO        SAFETY FIRST. .call before you fall

## 2023-03-16 ENCOUNTER — ANESTHESIA EVENT (OUTPATIENT)
Dept: OPERATING ROOM | Age: 58
End: 2023-03-16
Payer: COMMERCIAL

## 2023-03-16 NOTE — DISCHARGE INSTRUCTIONS
DR. Chinmay Leahy                  SHOULDER ARTHROSCOPY POSTOPERATIVE INSTRUCTIONS      ACTIVITIES:  Keep arm in sling after surgery. You may move your wrist and hand as much possible. Dr Sivan Perdomo will tell you when you can stop wearing the sling. DRESSING: After surgery, a bulky dressing, and sometimes an ice cuff will be applied to your shoulder. It is normal to have a moderate amount of blood-tinted fluid drainage on the dressing. Keep the dressing clean and dry. ICE/COOLING: Apply ice to your shoulder if you did not purchase an ice cuff. If you did purchase a cooling cuff, follow the instructions included with the cuff to keep it cold. Wear the cuff continuously for 72 hours postoperatively. You can ice intermittently (such as 30 minutes on, 30 minutes off). Make sure the ice or cooling cuff is not directly in contact with your skin. Prolonged contact can result in frostbite. After 3 days, use after exercising, or to help with pain and swelling, for 30 minutes, 3 to 5 times a day. DRIVING:  You may drive once you are out of your sling, have stopped your pain medication, and can use your shoulder normally. This may be a decision to be made between you and your physical therapist. [place] [address] must be able to control the steering wheel comfortably. You are the one ultimately responsible when behind the wheel. SHOWERING: You may begin to shower 3 days after your surgery. Keep the incisions covered. Dr Sivan Perdomo will tell you if you need to wait longer. MEDICATION: Although you have a prescription for a strong pain medication, many patients are able to handle the discomfort postoperatively with a milder medication such as Extra-strength Tylenol. You may or may not (based on your surgery - Dr. Sivan Perdomo will let you know) also have had a prescription for an anti-inflammatory such as Celebrex or Naprosyn, and an anti-nausea medication such as Phenergan. Take the anti-inflammatory as scheduled with food, but take the narcotic and anti-nausea medication as needed. Narcotic pain medications can cause constipation. Make sure you are drinking adequate amounts of water. Take fiber supplements as needed. Consider using an over-the-counter stool softener. EXERCISE: Exercises are extremely important following arthroscopic surgery to help you regain the motion and strength of your shoulder. That is why we try to get you into physical therapy as soon as possible after surgery. FOLLOW-UP: You should already have your first postoperative visits scheduled at the time your surgery is scheduled. If you do not, please call the office at [phone] to make your appointment. At the first visit,we will perform a wound check and go over the pictures from your surgery. At the second visit, we will remove your stitches.

## 2023-03-17 ENCOUNTER — HOSPITAL ENCOUNTER (OUTPATIENT)
Age: 58
Setting detail: OUTPATIENT SURGERY
Discharge: HOME | End: 2023-03-17
Attending: ORTHOPAEDIC SURGERY | Admitting: ORTHOPAEDIC SURGERY
Payer: COMMERCIAL

## 2023-03-17 ENCOUNTER — ANESTHESIA (OUTPATIENT)
Dept: OPERATING ROOM | Age: 58
End: 2023-03-17
Payer: COMMERCIAL

## 2023-03-17 VITALS
RESPIRATION RATE: 16 BRPM | SYSTOLIC BLOOD PRESSURE: 123 MMHG | BODY MASS INDEX: 23.9 KG/M2 | OXYGEN SATURATION: 92 % | DIASTOLIC BLOOD PRESSURE: 81 MMHG | TEMPERATURE: 98.2 F | HEART RATE: 96 BPM | WEIGHT: 140 LBS | HEIGHT: 64 IN

## 2023-03-17 DIAGNOSIS — M75.122 COMPLETE TEAR OF LEFT ROTATOR CUFF, UNSPECIFIED WHETHER TRAUMATIC: Primary | ICD-10-CM

## 2023-03-17 PROCEDURE — 64415 NJX AA&/STRD BRCH PLXS IMG: CPT | Performed by: ANESTHESIOLOGY

## 2023-03-17 PROCEDURE — 7100000010 HC PHASE II RECOVERY - FIRST 15 MIN: Performed by: ORTHOPAEDIC SURGERY

## 2023-03-17 PROCEDURE — 6360000002 HC RX W HCPCS

## 2023-03-17 PROCEDURE — 29827 SHO ARTHRS SRG RT8TR CUF RPR: CPT | Performed by: ORTHOPAEDIC SURGERY

## 2023-03-17 PROCEDURE — C1769 GUIDE WIRE: HCPCS | Performed by: ORTHOPAEDIC SURGERY

## 2023-03-17 PROCEDURE — 2500000003 HC RX 250 WO HCPCS: Performed by: ANESTHESIOLOGY

## 2023-03-17 PROCEDURE — 7100000000 HC PACU RECOVERY - FIRST 15 MIN: Performed by: ORTHOPAEDIC SURGERY

## 2023-03-17 PROCEDURE — 3700000000 HC ANESTHESIA ATTENDED CARE: Performed by: ORTHOPAEDIC SURGERY

## 2023-03-17 PROCEDURE — 2580000003 HC RX 258: Performed by: ORTHOPAEDIC SURGERY

## 2023-03-17 PROCEDURE — 6360000002 HC RX W HCPCS: Performed by: ORTHOPAEDIC SURGERY

## 2023-03-17 PROCEDURE — 2709999900 HC NON-CHARGEABLE SUPPLY: Performed by: ORTHOPAEDIC SURGERY

## 2023-03-17 PROCEDURE — C1713 ANCHOR/SCREW BN/BN,TIS/BN: HCPCS | Performed by: ORTHOPAEDIC SURGERY

## 2023-03-17 PROCEDURE — 3600000004 HC SURGERY LEVEL 4 BASE: Performed by: ORTHOPAEDIC SURGERY

## 2023-03-17 PROCEDURE — 2720000010 HC SURG SUPPLY STERILE: Performed by: ORTHOPAEDIC SURGERY

## 2023-03-17 PROCEDURE — 2580000003 HC RX 258: Performed by: ANESTHESIOLOGY

## 2023-03-17 PROCEDURE — 7100000011 HC PHASE II RECOVERY - ADDTL 15 MIN: Performed by: ORTHOPAEDIC SURGERY

## 2023-03-17 PROCEDURE — 6370000000 HC RX 637 (ALT 250 FOR IP): Performed by: ORTHOPAEDIC SURGERY

## 2023-03-17 PROCEDURE — A4217 STERILE WATER/SALINE, 500 ML: HCPCS | Performed by: ORTHOPAEDIC SURGERY

## 2023-03-17 PROCEDURE — 7100000001 HC PACU RECOVERY - ADDTL 15 MIN: Performed by: ORTHOPAEDIC SURGERY

## 2023-03-17 PROCEDURE — 29826 SHO ARTHRS SRG DECOMPRESSION: CPT | Performed by: ORTHOPAEDIC SURGERY

## 2023-03-17 PROCEDURE — L3809 WHFO W/O JOINTS PRE OTS: HCPCS | Performed by: ORTHOPAEDIC SURGERY

## 2023-03-17 PROCEDURE — 2580000003 HC RX 258

## 2023-03-17 PROCEDURE — 3600000014 HC SURGERY LEVEL 4 ADDTL 15MIN: Performed by: ORTHOPAEDIC SURGERY

## 2023-03-17 PROCEDURE — 3700000001 HC ADD 15 MINUTES (ANESTHESIA): Performed by: ORTHOPAEDIC SURGERY

## 2023-03-17 PROCEDURE — 2500000003 HC RX 250 WO HCPCS

## 2023-03-17 DEVICE — BIO-COMP SWIVELOCK SP, 5.5X24.5MM
Type: IMPLANTABLE DEVICE | Site: SHOULDER | Status: FUNCTIONAL
Brand: ARTHREX®

## 2023-03-17 RX ORDER — MIDAZOLAM HYDROCHLORIDE 1 MG/ML
INJECTION INTRAMUSCULAR; INTRAVENOUS
Status: DISCONTINUED
Start: 2023-03-17 | End: 2023-03-17 | Stop reason: HOSPADM

## 2023-03-17 RX ORDER — ONDANSETRON 2 MG/ML
INJECTION INTRAMUSCULAR; INTRAVENOUS PRN
Status: DISCONTINUED | OUTPATIENT
Start: 2023-03-17 | End: 2023-03-17 | Stop reason: SDUPTHER

## 2023-03-17 RX ORDER — BUPIVACAINE HYDROCHLORIDE 5 MG/ML
INJECTION, SOLUTION EPIDURAL; INTRACAUDAL
Status: DISCONTINUED | OUTPATIENT
Start: 2023-03-17 | End: 2023-03-17 | Stop reason: SDUPTHER

## 2023-03-17 RX ORDER — MAGNESIUM HYDROXIDE 1200 MG/15ML
LIQUID ORAL CONTINUOUS PRN
Status: DISCONTINUED | OUTPATIENT
Start: 2023-03-17 | End: 2023-03-17 | Stop reason: HOSPADM

## 2023-03-17 RX ORDER — BUPIVACAINE HYDROCHLORIDE 5 MG/ML
INJECTION, SOLUTION EPIDURAL; INTRACAUDAL
Status: COMPLETED
Start: 2023-03-17 | End: 2023-03-17

## 2023-03-17 RX ORDER — PROPOFOL 10 MG/ML
INJECTION, EMULSION INTRAVENOUS PRN
Status: DISCONTINUED | OUTPATIENT
Start: 2023-03-17 | End: 2023-03-17 | Stop reason: SDUPTHER

## 2023-03-17 RX ORDER — ROCURONIUM BROMIDE 10 MG/ML
INJECTION, SOLUTION INTRAVENOUS PRN
Status: DISCONTINUED | OUTPATIENT
Start: 2023-03-17 | End: 2023-03-17 | Stop reason: SDUPTHER

## 2023-03-17 RX ORDER — SODIUM CHLORIDE 9 MG/ML
INJECTION, SOLUTION INTRAVENOUS PRN
Status: DISCONTINUED | OUTPATIENT
Start: 2023-03-17 | End: 2023-03-17 | Stop reason: HOSPADM

## 2023-03-17 RX ORDER — OXYCODONE HYDROCHLORIDE 5 MG/1
5 TABLET ORAL PRN
Status: DISCONTINUED | OUTPATIENT
Start: 2023-03-17 | End: 2023-03-17 | Stop reason: HOSPADM

## 2023-03-17 RX ORDER — GLYCOPYRROLATE 0.2 MG/ML
INJECTION INTRAMUSCULAR; INTRAVENOUS PRN
Status: DISCONTINUED | OUTPATIENT
Start: 2023-03-17 | End: 2023-03-17 | Stop reason: SDUPTHER

## 2023-03-17 RX ORDER — FENTANYL CITRATE 50 UG/ML
INJECTION, SOLUTION INTRAMUSCULAR; INTRAVENOUS PRN
Status: DISCONTINUED | OUTPATIENT
Start: 2023-03-17 | End: 2023-03-17 | Stop reason: SDUPTHER

## 2023-03-17 RX ORDER — SODIUM CHLORIDE 0.9 % (FLUSH) 0.9 %
5-40 SYRINGE (ML) INJECTION EVERY 12 HOURS SCHEDULED
Status: DISCONTINUED | OUTPATIENT
Start: 2023-03-17 | End: 2023-03-17 | Stop reason: HOSPADM

## 2023-03-17 RX ORDER — SODIUM CHLORIDE 9 MG/ML
INJECTION, SOLUTION INTRAVENOUS CONTINUOUS PRN
Status: DISCONTINUED | OUTPATIENT
Start: 2023-03-17 | End: 2023-03-17 | Stop reason: SDUPTHER

## 2023-03-17 RX ORDER — OXYCODONE HYDROCHLORIDE AND ACETAMINOPHEN 5; 325 MG/1; MG/1
1 TABLET ORAL EVERY 4 HOURS PRN
Qty: 40 TABLET | Refills: 0 | Status: SHIPPED | OUTPATIENT
Start: 2023-03-17 | End: 2023-03-24

## 2023-03-17 RX ORDER — LIDOCAINE HYDROCHLORIDE 20 MG/ML
INJECTION, SOLUTION EPIDURAL; INFILTRATION; INTRACAUDAL; PERINEURAL
Status: DISCONTINUED
Start: 2023-03-17 | End: 2023-03-17 | Stop reason: HOSPADM

## 2023-03-17 RX ORDER — DEXAMETHASONE SODIUM PHOSPHATE 4 MG/ML
INJECTION, SOLUTION INTRA-ARTICULAR; INTRALESIONAL; INTRAMUSCULAR; INTRAVENOUS; SOFT TISSUE PRN
Status: DISCONTINUED | OUTPATIENT
Start: 2023-03-17 | End: 2023-03-17 | Stop reason: SDUPTHER

## 2023-03-17 RX ORDER — POVIDONE-IODINE 10 MG/G
OINTMENT TOPICAL
Status: COMPLETED | OUTPATIENT
Start: 2023-03-17 | End: 2023-03-17

## 2023-03-17 RX ORDER — PROMETHAZINE HYDROCHLORIDE 25 MG/1
25 TABLET ORAL EVERY 6 HOURS PRN
Qty: 5 TABLET | Refills: 0 | Status: SHIPPED | OUTPATIENT
Start: 2023-03-17

## 2023-03-17 RX ORDER — SODIUM CHLORIDE 0.9 % (FLUSH) 0.9 %
5-40 SYRINGE (ML) INJECTION PRN
Status: DISCONTINUED | OUTPATIENT
Start: 2023-03-17 | End: 2023-03-17 | Stop reason: HOSPADM

## 2023-03-17 RX ORDER — LIDOCAINE HYDROCHLORIDE 10 MG/ML
INJECTION, SOLUTION EPIDURAL; INFILTRATION; INTRACAUDAL; PERINEURAL
Status: DISCONTINUED
Start: 2023-03-17 | End: 2023-03-17 | Stop reason: HOSPADM

## 2023-03-17 RX ORDER — OXYCODONE HYDROCHLORIDE 10 MG/1
10 TABLET ORAL PRN
Status: DISCONTINUED | OUTPATIENT
Start: 2023-03-17 | End: 2023-03-17 | Stop reason: HOSPADM

## 2023-03-17 RX ORDER — LIDOCAINE HYDROCHLORIDE 20 MG/ML
INJECTION, SOLUTION EPIDURAL; INFILTRATION; INTRACAUDAL; PERINEURAL PRN
Status: DISCONTINUED | OUTPATIENT
Start: 2023-03-17 | End: 2023-03-17 | Stop reason: SDUPTHER

## 2023-03-17 RX ORDER — ONDANSETRON 2 MG/ML
4 INJECTION INTRAMUSCULAR; INTRAVENOUS
Status: DISCONTINUED | OUTPATIENT
Start: 2023-03-17 | End: 2023-03-17 | Stop reason: HOSPADM

## 2023-03-17 RX ADMIN — FENTANYL CITRATE 50 MCG: 50 INJECTION INTRAMUSCULAR; INTRAVENOUS at 09:43

## 2023-03-17 RX ADMIN — SODIUM CHLORIDE: 9 INJECTION, SOLUTION INTRAVENOUS at 09:39

## 2023-03-17 RX ADMIN — ROCURONIUM BROMIDE 35 MG: 10 INJECTION INTRAVENOUS at 09:44

## 2023-03-17 RX ADMIN — BUPIVACAINE HYDROCHLORIDE 30 ML: 5 INJECTION, SOLUTION EPIDURAL; INTRACAUDAL; PERINEURAL at 11:23

## 2023-03-17 RX ADMIN — FENTANYL CITRATE 50 MCG: 50 INJECTION INTRAMUSCULAR; INTRAVENOUS at 10:02

## 2023-03-17 RX ADMIN — SODIUM CHLORIDE: 9 INJECTION, SOLUTION INTRAVENOUS at 07:49

## 2023-03-17 RX ADMIN — GLYCOPYRROLATE 0.2 MG: 0.2 INJECTION INTRAMUSCULAR; INTRAVENOUS at 10:37

## 2023-03-17 RX ADMIN — SODIUM CHLORIDE: 9 INJECTION, SOLUTION INTRAVENOUS at 10:34

## 2023-03-17 RX ADMIN — ONDANSETRON 4 MG: 2 INJECTION INTRAMUSCULAR; INTRAVENOUS at 10:41

## 2023-03-17 RX ADMIN — LIDOCAINE HYDROCHLORIDE 60 MG: 20 INJECTION, SOLUTION EPIDURAL; INFILTRATION; INTRACAUDAL; PERINEURAL at 09:43

## 2023-03-17 RX ADMIN — SUGAMMADEX 200 MG: 100 INJECTION, SOLUTION INTRAVENOUS at 10:42

## 2023-03-17 RX ADMIN — CEFAZOLIN 2000 MG: 2 INJECTION, POWDER, FOR SOLUTION INTRAMUSCULAR; INTRAVENOUS at 09:49

## 2023-03-17 RX ADMIN — DEXAMETHASONE SODIUM PHOSPHATE 10 MG: 4 INJECTION, SOLUTION INTRAMUSCULAR; INTRAVENOUS at 09:49

## 2023-03-17 RX ADMIN — PROPOFOL 175 MG: 10 INJECTION, EMULSION INTRAVENOUS at 09:43

## 2023-03-17 ASSESSMENT — PAIN SCALES - GENERAL
PAINLEVEL_OUTOF10: 0

## 2023-03-17 ASSESSMENT — PAIN DESCRIPTION - LOCATION: LOCATION: SHOULDER

## 2023-03-17 ASSESSMENT — PAIN DESCRIPTION - DESCRIPTORS
DESCRIPTORS: ACHING
DESCRIPTORS: ACHING

## 2023-03-17 ASSESSMENT — ENCOUNTER SYMPTOMS: SHORTNESS OF BREATH: 0

## 2023-03-17 ASSESSMENT — PAIN - FUNCTIONAL ASSESSMENT: PAIN_FUNCTIONAL_ASSESSMENT: 0-10

## 2023-03-17 NOTE — ANESTHESIA PRE PROCEDURE
[facility] Department of Anesthesiology  Pre-Anesthesia Evaluation/Consultation       Name:  Yamilka Hall  : 1965  Age:  62 y.o. MRN:  [id number]  Date: 3/17/2023           Surgeon: Surgeon(s):  Claudetta Caster, MD    Procedure: Procedure(s):  LEFT SHOULDER ARTHROSCOPY, SUBACROMIAL DECOMPRESSION, ROTATOR CUFF REPAIR     No Known Allergies  Patient Active Problem List   Diagnosis    Dysmenorrhea    Pelvic pain in female    Thickened endometrium    Menorrhagia     Past Medical History:   Diagnosis Date    Anxiety and depression     Cervical dysplasia 25 years ago    Dysmenorrhea 2012    Endometriosis 7-8 years ago   Christdayron Gopal Wears glasses      Past Surgical History:   Procedure Laterality Date    ARM SURGERY Left     left arm fracture surgery    BUNIONECTOMY Bilateral     left--Silver type bunionectomy of the left 1st MP--   [place] [address]    couldn't deliver   [address] [place] [address]  7-8 years ago    ENDOMETRIAL ABLATION      ENDOMETRIAL BIOPSY  2012    thickenedned endometrium    HARDWARE REMOVAL      KNEE ARTHROSCOPY Right     LEEP  25 years ago    PELVIC LAPAROSCOPY      endometriosis    SHOULDER SURGERY Right     rotator cuff     Social History     Tobacco Use    Smoking status: Every Day     Packs/day: 0.50     Years: 30.00     Pack years: 15.00     Types: Cigarettes    Smokeless tobacco: Former     Quit date: 2016    Tobacco comments:     cessation , 10/15   Vaping Use    Vaping Use: Never used   Substance Use Topics    Alcohol use: Yes     Alcohol/week: 0.0 standard drinks     Comment: socially    Drug use: Yes     Types: Marijuana [place])     Comment: smokes 1-2 gm per week     Medications  No current facility-administered medications on file prior to encounter.      Current Outpatient Medications on File Prior to Encounter   Medication Sig Dispense Refill    atorvastatin (LIPITOR) 20 MG tablet TAKE 1 TABLET BY MOUTH AT BEDTIME      buPROPion (WELLBUTRIN XL) 300 MG extended release tablet TAKE 1 TABLET BY MOUTH EVERY DAY      diazePAM (VALIUM) 5 MG tablet TAKE 1 TABLET BY MOUTH TWO TIMES A DAY AS NEEDED      lisinopril (PRINIVIL;ZESTRIL) 20 MG tablet Take 20 mg by mouth nightly      traZODone (DESYREL) 50 MG tablet TAKE 1 TABLET BY MOUTH AT BEDTIME      PARoxetine (PAXIL) 20 MG tablet Take 20 mg by mouth every morning       Current Facility-Administered Medications   Medication Dose Route Frequency Provider Last Rate Last Admin    ceFAZolin (ANCEF) 2,000 mg in sodium chloride 0.9 % 50 mL IVPB (mini-bag)  2,000 mg IntraVENous Once Cecilia MD Monisha        sodium chloride flush 0.9 % injection 5-40 mL  5-40 mL IntraVENous 2 times per day Arnav Salazar MD        sodium chloride flush 0.9 % injection 5-40 mL  5-40 mL IntraVENous PRN Arnav Salazar MD        0.9 % sodium chloride infusion   IntraVENous PRN Arnav Salazar  mL/hr at 23 0749 New Bag at 23     Vital Signs (Current)   Vitals:    23 1346 23   BP:  113/71   Pulse:  65   Resp:  18   Temp:  97 °F (36.1 °C)   TempSrc:  Temporal   SpO2:  98%   Weight: 140 lb (63.5 kg)    Height: 5' 4\" (1.626 m)                                             Vital Signs Statistics (for past 48 hrs)     Temp  Av °F (36.1 °C)  Min: 97 °F (36.1 °C)   Min taken time: 23  Max: 97 °F (36.1 °C)   Max taken time: 23  Pulse  Av  Min: 72   Min taken time: 23  Max: 72   Max taken time: 23  Resp  Av  Min: 25   Min taken time: 23  Max: 18   Max taken time: 23  BP  Min: 113/71   Min taken time: 23  Max: 113/71   Max taken time: 23  SpO2  Av %  Min: 98 %   Min taken time: 23 07  Max: 98 %   Max taken time: 23 07  BP Readings from Last 3 Encounters:   23 113/71   18 117/82   17 131/81       BMI  Body mass index is 24.03 kg/m². Estimated body mass index is 24.03 kg/m² as calculated from the following:    Height as of this encounter: 5' 4\" (1.626 m). Weight as of this encounter: 140 lb (63.5 kg). CBC   Lab Results   Component Value Date/Time    WBC 6.2 05/11/2012 10:40 AM    RBC 4.66 05/11/2012 10:40 AM    HGB 14.4 05/11/2012 10:40 AM    HCT 41.9 05/11/2012 10:40 AM    MCV 89.9 05/11/2012 10:40 AM    RDW 14.7 05/11/2012 10:40 AM     05/11/2012 10:40 AM     CMP    Lab Results   Component Value Date/Time     05/11/2012 10:40 AM    K 3.7 05/11/2012 10:40 AM     05/11/2012 10:40 AM    CO2 27 05/11/2012 10:40 AM    BUN 12 05/11/2012 10:40 AM    CREATININE 0.7 05/11/2012 10:40 AM    GFRAA >60 05/11/2012 10:40 AM    AGRATIO 1.6 01/09/2012 08:05 AM    GLUCOSE 55 05/11/2012 10:40 AM    PROT 7.2 01/09/2012 08:05 AM    CALCIUM 10.1 05/11/2012 10:40 AM    BILITOT 0.50 01/09/2012 08:05 AM    ALKPHOS 58 01/09/2012 08:05 AM    AST 19 01/09/2012 08:05 AM    ALT 18 01/09/2012 08:05 AM     BMP    Lab Results   Component Value Date/Time     05/11/2012 10:40 AM    K 3.7 05/11/2012 10:40 AM     05/11/2012 10:40 AM    CO2 27 05/11/2012 10:40 AM    BUN 12 05/11/2012 10:40 AM    CREATININE 0.7 05/11/2012 10:40 AM    CALCIUM 10.1 05/11/2012 10:40 AM    GFRAA >60 05/11/2012 10:40 AM    GLUCOSE 55 05/11/2012 10:40 AM     POCGlucose  No results for input(s): GLUCOSE in the last 72 hours.    Coags  No results found for: PROTIME, INR, APTT  HCG (If Applicable)   Lab Results   Component Value Date    PREGTESTUR Negative 05/05/2016      ABGs No results found for: PHART, PO2ART, HGB1PZF, RYX3TAO, BEART, E1IZWWLJ   Type & Screen (If Applicable)  Lab Results   Component Value Date    LABABO A 05/11/2012    LABRH Positive 05/11/2012                            BMI: Wt Readings from Last 3 Encounters:       NPO Status:   Date of last liquid consumption: 03/16/23   Time of last liquid consumption: 2300   Date of last solid food consumption: 03/16/23      Time of last solid consumption: 2300       Anesthesia Evaluation  Patient summary reviewed and Nursing notes reviewed  Airway: Mallampati: II  TM distance: >3 FB   Neck ROM: full  Mouth opening: > = 3 FB   Dental:          Pulmonary:       (-) COPD, asthma and shortness of breath                           Cardiovascular:        (-) hypertension, valvular problems/murmurs, past MI, CAD, CABG/stent, dysrhythmias,  angina and no hyperlipidemia                Neuro/Psych:   (+) depression/anxiety    (-) seizures, TIA and CVA           GI/Hepatic/Renal:        (-) GERD, PUD, liver disease and no renal disease       Endo/Other:        (-) diabetes mellitus               Abdominal:             Vascular: negative vascular ROS. Other Findings:           Anesthesia Plan      general and regional     ASA 2     (I discussed with the patient the risks and benefits of PIV, general anesthesia, IV Narcotics, PACU. All questions were answered the patient agrees with the plan.)  Induction: intravenous. Anesthetic plan and risks discussed with patient. Plan discussed with CRNA. This pre-anesthesia assessment may be used as a history and physical.    DOS STAFF ADDENDUM:    Pt seen and examined, chart reviewed (including anesthesia, drug and allergy history). No interval changes to history and physical examination. Anesthetic plan, risks, benefits, alternatives, and personnel involved discussed with patient. Patient verbalized an understanding and agrees to proceed.       Henny Medina MD  March 17, 2023  8:23 AM

## 2023-03-17 NOTE — BRIEF OP NOTE
Brief Postoperative Note      Patient: Harsha Conte  YOB: 1965  MRN: [id number]    Date of Procedure: 3/17/2023    Pre-Op Diagnosis: LEFT SHOULDER ROTATOR CUFF TEAR    Post-Op Diagnosis: Same + type 2 SLAP tear       Procedure(s):  LEFT SHOULDER ARTHROSCOPY, SUBACROMIAL DECOMPRESSION, ROTATOR CUFF REPAIR, BICEPS TENOTOMY    Surgeon(s):  Pallavi Joshi MD    Assistant:  Surgical Assistant: Rock Archibald    Anesthesia: General    Estimated Blood Loss (mL): Minimal    Complications: None    Specimens:   * No specimens in log *    Implants:  Implant Name Type Inv. Item Serial No.  Lot No. LRB No. Used Action   ANCHOR SUT L24.5MM DIA5. 5MM BIOCOMP VENT SELF PUNCHING - [id number]  ANCHOR SUT L24.5MM DIA5. 5MM BIOCOMP VENT SELF PUNCHING  89 ayla Newell [employer] [facility]- [id number] Left 1 Implanted             Electronically signed by Pallavi Joshi MD on 3/17/2023 at 10:43 AM

## 2023-03-17 NOTE — FLOWSHEET NOTE
Patient and responsible adult verbalized understanding of discharge instructions, sedation medication, and potential complications including pain. Patient instructed to call Doctor if complications occur. Received meds from pharmacy.

## 2023-03-17 NOTE — ANESTHESIA PROCEDURE NOTES
Peripheral Block    Patient location during procedure: pre-op  Reason for block: post-op pain management and at surgeon's request  Start time: 3/17/2023 9:23 AM  Staffing  Performed: anesthesiologist   Anesthesiologist: Beckie Sandifer, MD  Preanesthetic Checklist  Completed: patient identified, IV checked, site marked, risks and benefits discussed, surgical/procedural consents, equipment checked, pre-op evaluation, timeout performed, anesthesia consent given, oxygen available and monitors applied/VS acknowledged  Peripheral Block   Patient position: sitting  Prep: ChloraPrep  Provider prep: mask and sterile gloves  Patient monitoring: continuous pulse ox, frequent blood pressure checks and IV access  Block type: Brachial plexus  Interscalene  Laterality: left  Injection technique: single-shot  Guidance: ultrasound guided  Local infiltration: lidocaine  Infiltration strength: 1 %  Local infiltration: lidocaine  Dose: 3 mL    Needle   Needle gauge: 21 G  Needle localization: ultrasound guidance  Needle length: 10 cm  Assessment   Injection assessment: negative aspiration for heme, no paresthesia on injection and local visualized surrounding nerve on ultrasound  Paresthesia pain: none  Slow fractionated injection: yes  Hemodynamics: stable    Additional Notes  Immediately prior to procedure a \"time out\" was called to verify the correct patient, allergies, laterality, procedure and equipment. Time out performed with Magdy ZAZUETA    Local Anesthetic: 0.5 %  Bupivacaine   Amount: 30 ml  in 5 ml increments after negative aspiration each time. Anterior scalene and middle scalene muscles, upper, middle and lower trunks of the brachial plexus are identified, the tip of the needle and the spread of the local anesthetic around the brachial plexus are visualized. The Brachial plexus appeared to be anatomically normal and there were no abnormal pathologically findings seen.        Versed 2mg IV  Medications Administered  bupivacaine (PF) 0.5 % - Perineural   30 mL - 3/17/2023 11:23:00 AM

## 2023-03-17 NOTE — PROGRESS NOTES
Pt still asleep at this time, pt states is ready to go to phase 2 for DC but immediately falls back to sleep. Still denies pain at this time.  vss

## 2023-03-17 NOTE — PROGRESS NOTES
Pt now more awake, able to stay away and answer questions, tolerating ice chips, states still no pain at this time. Vss. No signs of distress noted at this time.

## 2023-03-17 NOTE — ANESTHESIA POSTPROCEDURE EVALUATION
Department of Anesthesiology  Postprocedure Note    Patient: Nathan Castaneda  MRN: [id number]  YOB: 1965  Date of evaluation: 3/17/2023      Procedure Summary     Date: 03/17/23 Room / Location: [address]    Anesthesia Start: 1081 Anesthesia Stop: 1105    Procedure: LEFT SHOULDER ARTHROSCOPY, SUBACROMIAL DECOMPRESSION, ROTATOR CUFF REPAIR (Left: Shoulder) Diagnosis:       Nontraumatic complete tear of rotator cuff, left      (LEFT SHOULDER ROTATOR CUFF TEAR)    Surgeons: Don Feng MD Responsible Provider: Boris Lynch MD    Anesthesia Type: general, regional ASA Status: 2          Anesthesia Type: No value filed.     Sandeep Phase I: Sandeep Score: 10    Sandeep Phase II: Sandeep Score: 10      Anesthesia Post Evaluation    Patient location during evaluation: PACU  Level of consciousness: awake and alert  Airway patency: patent  Nausea & Vomiting: no nausea and no vomiting  Complications: no  Cardiovascular status: blood pressure returned to baseline  Respiratory status: acceptable  Hydration status: euvolemic  Comments: Postoperative Anesthesia Note    Name:    Nathan Castaneda  MRN:      [id number]    Patient Vitals in the past 12 hrs:  03/17/23 1305, BP:123/81, Pulse:96, Resp:16, SpO2:92 %  03/17/23 1251, BP:120/85, Temp:98.2 °F (36.8 °C), Temp src:Oral, Pulse:83, Resp:20, SpO2:92 %  03/17/23 1243, BP:121/83, Pulse:75, Resp:14, SpO2:92 %  03/17/23 1200, BP:124/79, Pulse:87, Resp:17, SpO2:92 %  03/17/23 1146, BP:117/86, Pulse:97, Resp:16, SpO2:93 %  03/17/23 1145, BP:117/86, Pulse:90, Resp:16, SpO2:93 %  03/17/23 1130, BP:123/84, Pulse:91, Resp:16, SpO2:93 %  03/17/23 1120, BP:127/82, Pulse:95, Resp:17  03/17/23 1115, BP:128/80, Pulse:(!) 103, Resp:16, SpO2:93 %  03/17/23 1110, BP:127/86, Pulse:(!) 102, Resp:18, SpO2:97 %  03/17/23 1107, Pulse:(!) 104  03/17/23 1105, BP:117/72, Pulse:100, Resp:18, SpO2:96 %  03/17/23 1103, BP:117/77, Temp:(!) 96.7 °F (35.9 °C), Temp src:Temporal, Pulse:100, Resp:21, SpO2:96 %  03/17/23 0744, BP:113/71, Temp:97 °F (36.1 °C), Temp src:Temporal, Pulse:65, Resp:18, SpO2:98 %     LABS:    CBC  Lab Results       Component                Value               Date/Time                  WBC                      6.2                 05/11/2012 10:40 AM        HGB                      14.4                05/11/2012 10:40 AM        HCT                      41.9                05/11/2012 10:40 AM        PLT                      263                 05/11/2012 10:40 AM   RENAL  Lab Results       Component                Value               Date/Time                  NA                       139                 05/11/2012 10:40 AM        K                        3.7                 05/11/2012 10:40 AM        CL                       105                 05/11/2012 10:40 AM        CO2                      27                  05/11/2012 10:40 AM        BUN                      12                  05/11/2012 10:40 AM        CREATININE               0.7                 05/11/2012 10:40 AM        GLUCOSE                  55 (L)              05/11/2012 10:40 AM   COAGS  No results found for: PROTIME, INR, APTT    Intake & Output:  @24HRIO@    Nausea & Vomiting:  No    Level of Consciousness:  Awake    Pain Assessment:  Adequate analgesia    Anesthesia Complications:  No apparent anesthetic complications    SUMMARY      Vital signs stable  OK to discharge from Stage I post anesthesia care.   Care transferred from Anesthesiology department on discharge from perioperative area

## 2023-03-17 NOTE — OP NOTE
Dulce Burnett (1965)    Date of Surgery- 3/17/2023      Preoperative Diagnosis:  Left shoulder rotator cuff tear                                           Postoperative Diagnosis:  Left shoulder rotator cuff tear, type II SLAP tear    Procedure:  Left shoulder Diagnostic arthroscopy, subacromial decompression, rotator cuff repair, biceps tenotomy      Surgeon: Rhys Vu MD    Surgical Assistant: Ethan Carter    Anesthesia: General and interscalene nerve block    Estimated blood loss:  minimal    Complications: none    Disposition:  To PACU in good condition      Indications for Procedure  Lex is a 57 y.o. female who was seen in the office for left shoulder pain.  MRI of her shoulder demonstrated full-thickness anterior supraspinatus tendon tear.  We discussed operative and nonoperative treatment options and recommended surgical intervention.  We discussed the risks, benefits, complications, and alternatives of the  procedure. The patient chose to proceed with the procedure. The patient subsequently provided written informed consent for the procedure.  At no time were any guarantees implied or stated.    Site Marking and Surgical Prep  The patient was seen in the preoperative holding area and the appropriate extremity marked.  Interscalene block was administered by the anesthesia department. The patient was taken to the operating room, identified, and transferred onto the operating room table in the supine position.  The patient was then induced under general anesthesia.  Patient received prophylactic preoperative IV antibiotics and had DVT prophylaxis with sequential compression devices on the bilateral lower extremities    Diagnostic Arthroscopy  The operative extremity was then sterilely prepped and draped in the standard fashion.  The arm was placed in an arthroscopic arm wild.  A timeout was performed, where the patient, the operative extremity, and operative procedure were once again verified.  A  standard posterior portal was then established. The arthroscope was then inserted into the glenohumeral joint. An anterior portal was then created under direct visualization using a spinal needle. Systematic arthroscopy was performed and the findings summarized below. 1.  Superior Labrum/Biceps Tendon-there is a slight longitudinal split within the intra-articular portion of the long head of the biceps tendon. There was a type II SLAP tear. 2.  Anterior/Posterior Labrum-mild anterior labral fraying  3. Rotator Cuff- The subscapularis tendon was intact. The supraspinatus tendon demonstrated full-thickness tear at the insertion site, with a slight transverse tear extending posteriorly into the infraspinatus. 4. Inferior Axillary Recess-  No loose bodies  5. Articular Surfaces-  Intact    Intra-articular Debridement  Long head biceps tenotomy performed using the electrocautery wand. Subacromial Decompression  The trocar and cannula were redirected to the subacromial space. The arthroscope and inflow were inserted. A lateral portal was created. Using a both a shaver and a radiofrequency probe, the bursa and the tissue beneath the acromion were removed. The coracromial ligament was reflected, but not resected. The patient had a moderate anterior acromial spur. A bur was placed through the lateral portal and an anterior acromioplasty was performed. Rotator Cuff Repair  Any hypertrophic bursa above the rotator cuff and in the subdeltoid space was debrided. The rotator cuff tear was exposed and the configuration identified. There was an insertional tear along the anterior aspect of the supraspinatus. There was a slight transverse tear less than 2 to 3 mm off the insertion site extending posteriorly into the infraspinatus. The tendon was mobilized to achieve a tension free repair.   The greater tuberosity was debrided at the rotator cuff footprint to a bleeding bony surface so that we would have a good healing surface for the tendon repair. I first repaired the transverse tear using a Arthrex FiberWire to perform a side-to-side repair. We then placed a Arthrex fibertape in a mattress fashion through the supraspinatus tendon. An Arthrex fiberlink was then placed along the anterior edge of the supraspinatus tendon tear, and fed through itself to create a cinch stitch. The 3 limbs of suture were then retrieved and fed through a self punching 5.5 mm SwiveLock, which was slightly inserted along the lateral aspect of the footprint. Tension was placed on all the sutures, reapproximate the tendon back down to the footprint. The anchor was then inserted for fixation. Closure  The shoulder was drained. Arthroscopic equipment was removed and the portals closed with 3-0 Prolene. Steri-Strips, Betadine ointment, 2 x 2's, and Tegaderm dressing were applied  Shoulder immobilizer was applied. The patient was awakened, transferred onto the hospital cart and taken to the PACU in stable condition. Plan  Patient will be recovered in the PACU and then discharged home. The patient will be in a sling for 6 weeks. We will start physical therapy 2 after surgery. Patient was given a prescription for Percocet and Phenergan. They were instructed to ice the shoulder is much as possible for the next 3-4 days, with specific instructions to avoid having the ice or cooling pad directly in contact with the skin to avoid the potential for frostbite. We will follow-up in the office in the next few days for wound check. Pam Jama.  Abbey Samuel MD  Orthopaedic Surgery and Sports Medicine

## 2023-03-20 ENCOUNTER — OFFICE VISIT (OUTPATIENT)
Dept: ORTHOPEDIC SURGERY | Age: 58
End: 2023-03-20

## 2023-03-20 VITALS — HEIGHT: 64 IN | BODY MASS INDEX: 23.9 KG/M2 | WEIGHT: 140 LBS | RESPIRATION RATE: 16 BRPM

## 2023-03-20 DIAGNOSIS — M75.122 NONTRAUMATIC COMPLETE TEAR OF LEFT ROTATOR CUFF: Primary | ICD-10-CM

## 2023-03-20 PROCEDURE — 99024 POSTOP FOLLOW-UP VISIT: CPT | Performed by: STUDENT IN AN ORGANIZED HEALTH CARE EDUCATION/TRAINING PROGRAM

## 2023-03-20 NOTE — PROGRESS NOTES
Shoulder Arthroscopy Follow-up  Paul Woods is here for follow up after left shoulder arthroscopic surgery. Surgery date was 3/17/23. Findings at surgery:  Left shoulder rotator cuff tear, type II SLAP tear. Pain is controlled with current analgesics. Medication(s) being used: percocet. The patient's pain is rated at 3/10. The patient denies fever, wound drainage, increasing redness, pus, increasing swelling. Post op problems reported: none. She has been in a sling and non-weightbearing as instructed. Physical Examination:  Vitals:    03/20/23 1355   Resp: 16   Weight: 140 lb (63.5 kg)   Height: 5' 4\" (1.626 m)       Patient is awake, alert, and in no acute distress. The incisions are clean, dry, no drainage. There is no warmth, erythema, or purulent drainage over the incisions. Sensation is intact to light touch in the axillary, median, radial, and ulnar nerve distribution bilaterally. The EPL, FPL, and interossei are grossly intact bilaterally. The Bilateral upper extremities are warm and well-perfused with brisk capillary refill. Assessment:   3 days status post left shoulder arthroscopy, subacromial decompression, rotator cuff repair, biceps tenotomy      Plan: We reviewed intra-operative arthroscopy photos. Start physical therapy at 2 weeks post op. Referral will be provided next time. The patient may not advance their weight-bearing. Sling for 6 weeks total after surgery. Elbow ROM when out of sling. The patient should use the cold pad or apply ice to the shoulder continuously for 3 days after surgery. Then use cold pad or ice 20-30 minutes only 3-5 times per day as needed. Refill pain medications as needed. Judicious use of NSAIDs. Patient may start showering now. No baths or soaks. Can leave open to air or apply band-aids to the incisions once out of Tegaderm. No driving while on pain medication if it causes impairment.  Do not recommend driving while

## 2023-03-27 DIAGNOSIS — M75.122 NONTRAUMATIC COMPLETE TEAR OF LEFT ROTATOR CUFF: Primary | ICD-10-CM

## 2023-03-27 RX ORDER — OXYCODONE HYDROCHLORIDE AND ACETAMINOPHEN 5; 325 MG/1; MG/1
1 TABLET ORAL EVERY 6 HOURS PRN
Qty: 28 TABLET | Refills: 0 | Status: SHIPPED | OUTPATIENT
Start: 2023-03-27 | End: 2023-04-03

## 2023-03-27 NOTE — TELEPHONE ENCOUNTER
L/M FOR ELLIE  Rx has been sent to [facility] and confirmed receipt at 3:19pm; pharmacy should contact her when available for p/u. Patient was instructed to contact the office with further questions or concerns.

## 2023-03-27 NOTE — TELEPHONE ENCOUNTER
Patient last seen 3/20/2023 and medication last filled 3/17/2023:     Last lab result completed/reported on: >3year old

## 2023-03-27 NOTE — TELEPHONE ENCOUNTER
Prescription Refill     Medication Name:  PAIN MED   [facility]  Patient Contact Number:  [phone]     RUNNING OUT OF THE MED FROM [facility]. PLEASE CALL TO LET Pt KNOW MED HAS BEEN SENT IN.

## 2023-04-04 ENCOUNTER — OFFICE VISIT (OUTPATIENT)
Dept: ORTHOPEDIC SURGERY | Age: 58
End: 2023-04-04

## 2023-04-04 VITALS — WEIGHT: 141 LBS | HEIGHT: 64 IN | RESPIRATION RATE: 16 BRPM | BODY MASS INDEX: 24.07 KG/M2

## 2023-04-04 DIAGNOSIS — M75.122 NONTRAUMATIC COMPLETE TEAR OF LEFT ROTATOR CUFF: Primary | ICD-10-CM

## 2023-04-04 PROCEDURE — 99024 POSTOP FOLLOW-UP VISIT: CPT | Performed by: ORTHOPAEDIC SURGERY

## 2023-04-04 NOTE — PROGRESS NOTES
Shoulder Arthroscopy Follow-up  Enrique Dewitt is here for follow up after left shoulder arthroscopic surgery. Surgery date was 3/17/23. Findings at surgery:  Left shoulder rotator cuff tear, type II SLAP tear. Pain is controlled with current analgesics. Medication(s) being used: percocet. The patient's pain is rated at 3/10. She has been in a sling and non-weightbearing as instructed. Physical Examination:  Resp 16   Ht 5' 4\" (1.626 m)   Wt 141 lb (64 kg)   BMI 24.20 kg/m²    Patient is awake, alert, and in no acute distress. The incisions are healing. Assessment:   2 weeks status post left shoulder arthroscopy, subacromial decompression, rotator cuff repair, biceps tenotomy      Plan:   Sutures were removed. Steri-strips and mastisol were applied. Patient may start taking baths or soaks at 4 weeks postop    Start physical therapy. Referral and protocol provided. The patient may not advance their weight-bearing as tolerated. Sling for 6 weeks total after surgery. Refill pain medications as needed. OTC NSAIDs as needed. Ice as needed. Return to office at the 6 week postop time period for evaluation of progress or prn if problems. Divina Blount. Diann Meyers MD  Orthopaedic Surgery and Sports Medicine     Disclaimer: This note was generated with use of a verbal recognition program (DRAGON) and an attempt was made to check for errors. It is possible that there are still dictated errors within this office note. If so, please bring any significant errors to my attention for an addendum. All efforts were made to ensure that this office note is accurate.

## 2023-04-06 ENCOUNTER — TELEPHONE (OUTPATIENT)
Dept: ORTHOPEDIC SURGERY | Age: 58
End: 2023-04-06

## 2023-04-06 NOTE — TELEPHONE ENCOUNTER
General Question     Subject: PATIENT IS WANTING TO NOW WHEN SHE IS ABLE TO START DRIVING. PLEASE ADVISE.   Patient Viviane Hilliard  Contact Number: [phone]

## 2023-04-12 ENCOUNTER — APPOINTMENT (OUTPATIENT)
Dept: PHYSICAL THERAPY | Age: 58
End: 2023-04-12
Payer: COMMERCIAL

## 2023-04-18 ENCOUNTER — APPOINTMENT (OUTPATIENT)
Dept: PHYSICAL THERAPY | Age: 58
End: 2023-04-18
Payer: COMMERCIAL

## 2023-04-18 ENCOUNTER — HOSPITAL ENCOUNTER (OUTPATIENT)
Dept: PHYSICAL THERAPY | Age: 58
Setting detail: THERAPIES SERIES
Discharge: HOME | End: 2023-04-18
Payer: COMMERCIAL

## 2023-04-18 PROCEDURE — 97140 MANUAL THERAPY 1/> REGIONS: CPT | Performed by: CHIROPRACTOR

## 2023-04-18 PROCEDURE — 97110 THERAPEUTIC EXERCISES: CPT | Performed by: CHIROPRACTOR

## 2023-04-18 NOTE — FLOWSHEET NOTE
[facility], [facility]  [address] [facility] [place], [facility]  Phone: [phone]   Fax:     [phone]        Physical Therapy Treatment Note/ Progress Report:       Date:  2023    Patient Name:  Michael Bravo    :  1965  MRN: [id number]    Pertinent Medical History:Additional Pertinent Hx: Anxiety, Depression    Medical/Treatment Diagnosis Information:  Medical Diagnosis: Nontraumatic complete tear of left rotator cuff [M75.122]  Treatment Diagnosis: Decreased ROM / Strength of L Shoulder    Insurance/Certification information:  PT Insurance Information: [facility]  Physician Information:  Dasha Fuentes MD  Plan of care signed (Y/N): Inbox    Date of Patient follow up with Physician:      Progress Report: []  Yes  [x]  No     Date Range for reporting period:  Beginnin2023  Ending:      Progress report due (10 Rx/or 30 days whichever is less):      Recertification due (POC duration/ or 90 days whichever is less):      Visit # POC/Insurance Allowable Auth Needed   2 /  96 units by 6/10/23 [x]Yes    []No     Functional Outcomes Measure:      Test: UEFI  Date Assessed Score   4/10/23 43           Pain level:  Ranges 3-6/10     History of Injury:    S/P L Rot cuff repair 3/17/23                                         Procedure:  Left shoulder Diagnostic arthroscopy, subacromial decompression, rotator cuff repair, biceps tenotomy    SUBJECTIVE:  See eval    OBJECTIVE:   Observation:   Test measurements:    ROM:  Date       Shldr flex     abd  ER        IR   P P P P   Eval 120 70 65 60   23 140 75 70                          RESTRICTIONS/PRECAUTIONS:     Exercises/Interventions:   Therapeutic Ex (45859)  Min: Resistance/Reps Notes/Cues     Can begin AAROM   AAROM Single arm press - 2x10  Supine abd  - 2x10  Supine protraction - 2x10                        Therapeutic Activity (86641) Min:

## 2023-04-20 ENCOUNTER — HOSPITAL ENCOUNTER (OUTPATIENT)
Dept: PHYSICAL THERAPY | Age: 58
Setting detail: THERAPIES SERIES
Discharge: HOME | End: 2023-04-20
Payer: COMMERCIAL

## 2023-04-20 PROCEDURE — 97110 THERAPEUTIC EXERCISES: CPT | Performed by: CHIROPRACTOR

## 2023-04-20 PROCEDURE — 97140 MANUAL THERAPY 1/> REGIONS: CPT | Performed by: CHIROPRACTOR

## 2023-04-20 NOTE — FLOWSHEET NOTE
[facility], [facility]  [address] [facility] [facility] [place], [facility]  Phone: [phone]   Fax:     [phone]        Physical Therapy Treatment Note/ Progress Report:       Date:  2023    Patient Name:  Jean Eaton    :  1965  MRN: [id number]    Pertinent Medical History:Additional Pertinent Hx: Anxiety, Depression    Medical/Treatment Diagnosis Information:  Medical Diagnosis: Nontraumatic complete tear of left rotator cuff [M75.122]  Treatment Diagnosis: Decreased ROM / Strength of L Shoulder    Insurance/Certification information:  PT Insurance Information: [facility]  Physician Information:  Ene Peres MD  Plan of care signed (Y/N): Inbox    Date of Patient follow up with Physician:      Progress Report: []  Yes  [x]  No     Date Range for reporting period:  Beginnin2023  Ending:      Progress report due (10 Rx/or 30 days whichever is less):      Recertification due (POC duration/ or 90 days whichever is less):      Visit # POC/Insurance Allowable Auth Needed   3 /  96 units by 6/10/23 [x]Yes    []No     Functional Outcomes Measure:      Test: UEFI  Date Assessed Score   4/10/23 43           Pain level:  Ranges 0-3/10     History of Injury:    S/P L Rot cuff repair 3/17/23                                         Procedure:  Left shoulder Diagnostic arthroscopy, subacromial decompression, rotator cuff repair, biceps tenotomy    SUBJECTIVE:  See eval    OBJECTIVE:   Observation:   Test measurements:    ROM:  Date       Shldr flex     abd  ER        IR   P P P P   Eval 120 70 65 60   23 140 75 70                          RESTRICTIONS/PRECAUTIONS:     Exercises/Interventions:   Therapeutic Ex (60599)  Min: Resistance/Reps Notes/Cues     Can begin AAROM   AAROM Single arm press - 2x10  Supine abd  - 2x10  Supine protraction - 2x10                        Therapeutic Activity (94199) Min:

## 2023-04-24 ENCOUNTER — HOSPITAL ENCOUNTER (OUTPATIENT)
Dept: PHYSICAL THERAPY | Age: 58
Setting detail: THERAPIES SERIES
Discharge: HOME | End: 2023-04-24
Payer: COMMERCIAL

## 2023-04-24 PROCEDURE — 97140 MANUAL THERAPY 1/> REGIONS: CPT | Performed by: CHIROPRACTOR

## 2023-04-24 PROCEDURE — 97110 THERAPEUTIC EXERCISES: CPT | Performed by: CHIROPRACTOR

## 2023-04-24 NOTE — FLOWSHEET NOTE
[facility], [facility]  [address] [facility], [facility]  Phone: [phone]   Fax:     [phone]        Physical Therapy Treatment Note/ Progress Report:       Date:  2023    Patient Name:  Malu Sorenson    :  1965  MRN: [id number]    Pertinent Medical History:Additional Pertinent Hx: Anxiety, Depression    Medical/Treatment Diagnosis Information:  Medical Diagnosis: Nontraumatic complete tear of left rotator cuff [M75.122]  Treatment Diagnosis: Decreased ROM / Strength of L Shoulder    Insurance/Certification information:  PT Insurance Information: [facility]  Physician Information:  Sukhi Pryor MD  Plan of care signed (Y/N): Inbox    Date of Patient follow up with Physician:      Progress Report: []  Yes  [x]  No     Date Range for reporting period:  Beginnin2023  Ending:      Progress report due (10 Rx/or 30 days whichever is less):      Recertification due (POC duration/ or 90 days whichever is less):      Visit # POC/Insurance Allowable Auth Needed    units by 6/10/23 [x]Yes    []No     Functional Outcomes Measure:      Test: UEFI  Date Assessed Score   4/10/23 43           Pain level: 1-2/10     History of Injury:    S/P L Rot cuff repair 3/17/23                                         Procedure:  Left shoulder Diagnostic arthroscopy, subacromial decompression, rotator cuff repair, biceps tenotomy    SUBJECTIVE:  23 - No complaints voiced    OBJECTIVE:   Observation:   Test measurements:    ROM:  Date       Shldr flex     abd  ER        IR   P P P P   Eval 120 70 65 60   23 140 75 70                          RESTRICTIONS/PRECAUTIONS:     Exercises/Interventions:   Therapeutic Ex (85413)  Min: Resistance/Reps Notes/Cues   UE ranger on floor  4 - wayx10      Can begin AAROM   AAROM Single arm press - 2x10  Supine abd  - 2x10  Supine protraction - 2x10

## 2023-04-26 ENCOUNTER — HOSPITAL ENCOUNTER (OUTPATIENT)
Dept: PHYSICAL THERAPY | Age: 58
Setting detail: THERAPIES SERIES
Discharge: HOME | End: 2023-04-26
Payer: COMMERCIAL

## 2023-04-26 PROCEDURE — 97140 MANUAL THERAPY 1/> REGIONS: CPT

## 2023-04-26 PROCEDURE — 97110 THERAPEUTIC EXERCISES: CPT

## 2023-04-26 NOTE — FLOWSHEET NOTE
Other:    If [facility] Please Indicate Time In/Out  CPT Code Time in Time out                                   Approval Dates:  CPT Code Units Approved Units Used  Date Updated:                     GOALS:  Patient stated goal: Return to work  [] Progressing: [] Met: [] Not Met: [] Adjusted    Therapist goals for Patient:   Short Term Goals: To be achieved in: 2 weeks  1. Independent in HEP and progression per patient tolerance, in order to prevent re-injury. [] Progressing: [] Met: [] Not Met: [] Adjusted  2. Patient will have a decrease in pain to facilitate improvement in movement, function, and ADLs as indicated by Functional Deficits. [] Progressing: [] Met: [] Not Met: [] Adjusted    Long Term Goals: To be achieved in: 8 weeks  1. Increase UEFI functional outcome score from 43 to > 60 to assist with reaching prior level of function. [] Progressing: [] Met: [] Not Met: [] Adjusted  2. Patient will demonstrate increased AROM to WNL to allow for proper joint functioning as indicated by Functional Deficits. [] Progressing: [] Met: [] Not Met: [] Adjusted  3. Patient will demonstrate an increase in [facility] and scapular strength  for proper functional mobility as indicated by patients Functional Deficits. [] Progressing: [] Met: [] Not Met: [] Adjusted  4. Patient will return to usual   activities without increased symptoms or restriction. [] Progressing: [] Met: [] Not Met: [] Adjusted    ASSESSMENT:  Tolerating AAROM well    Treatment/Activity Tolerance:  [x] Patient tolerated treatment well [] Patient limited by fatique  [] Patient limited by pain  [] Patient limited by other medical complications  [] Other:     Overall Progression Towards Functional goals/ Treatment Progress Update:  [] Patient is progressing as expected towards functional goals listed. [] Progression is slowed due to complexities/Impairments listed. [] Progression has been slowed due to co-morbidities.   [x] Plan just implemented, too soon

## 2023-05-01 ENCOUNTER — HOSPITAL ENCOUNTER (OUTPATIENT)
Dept: PHYSICAL THERAPY | Age: 58
Setting detail: THERAPIES SERIES
Discharge: HOME | End: 2023-05-01
Payer: COMMERCIAL

## 2023-05-01 PROCEDURE — 97110 THERAPEUTIC EXERCISES: CPT | Performed by: CHIROPRACTOR

## 2023-05-01 PROCEDURE — 97140 MANUAL THERAPY 1/> REGIONS: CPT | Performed by: CHIROPRACTOR

## 2023-05-01 NOTE — FLOWSHEET NOTE
[facility], [facility]  [address] [facility], [facility]  Phone: [phone]   Fax:     [phone]        Physical Therapy Treatment Note/ Progress Report:       Date:  2023    Patient Name:  Ligia Corcoran    :  1965  MRN: [id number]    Pertinent Medical History:Additional Pertinent Hx: Anxiety, Depression    Medical/Treatment Diagnosis Information:  Medical Diagnosis: Nontraumatic complete tear of left rotator cuff [M75.122]  Treatment Diagnosis: Decreased ROM / Strength of L Shoulder    Insurance/Certification information:  PT Insurance Information: [facility]  Physician Information:  Cornel Davila MD  Plan of care signed (Y/N): Inbox    Date of Patient follow up with Physician:      Progress Report: []  Yes  [x]  No     Date Range for reporting period:  Beginnin2023  Ending:      Progress report due (10 Rx/or 30 days whichever is less):      Recertification due (POC duration/ or 90 days whichever is less):      Visit # POC/Insurance Allowable Auth Needed     96 units by 6/10/23 [x]Yes    []No     Functional Outcomes Measure:      Test: UEFI  Date Assessed Score   4/10/23 43           Pain level: 210     History of Injury:    S/P L Rot cuff repair 3/17/23                                         Procedure:  Left shoulder Diagnostic arthroscopy, subacromial decompression, rotator cuff repair, biceps tenotomy    SUBJECTIVE:  23 - No complaints voiced  23 F/U with MD 23 . To PT with sling. Able to sleep in bed now.          OBJECTIVE:   Observation:   Test measurements:    ROM:  Date       Shldr flex     abd  ER        IR   P P P P   Eval 120 70 65 60   23 140 75 70    23 155 130 75                   RESTRICTIONS/PRECAUTIONS:     Exercises/Interventions:   Therapeutic Ex (81373)  Min: Resistance/Reps Notes/Cues  S/P L Rot cuff repair 3/17/23   UE ranger on floor  4

## 2023-05-02 ENCOUNTER — OFFICE VISIT (OUTPATIENT)
Dept: ORTHOPEDIC SURGERY | Age: 58
End: 2023-05-02

## 2023-05-02 VITALS — WEIGHT: 143.8 LBS | BODY MASS INDEX: 24.55 KG/M2 | RESPIRATION RATE: 16 BRPM | HEIGHT: 64 IN

## 2023-05-02 DIAGNOSIS — M75.122 NONTRAUMATIC COMPLETE TEAR OF LEFT ROTATOR CUFF: Primary | ICD-10-CM

## 2023-05-02 PROCEDURE — 99024 POSTOP FOLLOW-UP VISIT: CPT | Performed by: ORTHOPAEDIC SURGERY

## 2023-05-02 NOTE — PROGRESS NOTES
Shoulder Arthroscopy Follow-up  Dell Sykes is here for follow up after left shoulder arthroscopic surgery. Surgery date was 3/17/23. Findings at surgery:  Left shoulder rotator cuff tear, type II SLAP tear. Pain is controlled with current analgesics. Medication(s) being used: percocet. The patient's pain is rated at 1/10. She has been in a sling and non-weightbearing as instructed. She has been to PT at [facility] office. Physical Examination:  Resp 16   Ht 5' 4\" (1.626 m)   Wt 143 lb 12.8 oz (65.2 kg)   BMI 24.68 kg/m²    Patient is awake, alert, and in no acute distress. The incisions are healed. Assessment:   6 weeks status post left shoulder arthroscopy, subacromial decompression, rotator cuff repair, biceps tenotomy      Plan:   Continue physical therapy. The patient may advance their weight-bearing as tolerated. Wean from sling. Refill pain medications as needed. OTC NSAIDs as needed. Ice as needed. Follow up in 2 months for evaluation of progress or prn if problems. Papa Mantilla. Melchor Marie MD  Orthopaedic Surgery and Sports Medicine     Disclaimer: This note was generated with use of a verbal recognition program (DRAGON) and an attempt was made to check for errors. It is possible that there are still dictated errors within this office note. If so, please bring any significant errors to my attention for an addendum. All efforts were made to ensure that this office note is accurate.

## 2023-05-03 ENCOUNTER — HOSPITAL ENCOUNTER (OUTPATIENT)
Dept: PHYSICAL THERAPY | Age: 58
Setting detail: THERAPIES SERIES
Discharge: HOME | End: 2023-05-03
Payer: COMMERCIAL

## 2023-05-03 PROCEDURE — 97110 THERAPEUTIC EXERCISES: CPT | Performed by: CHIROPRACTOR

## 2023-05-03 PROCEDURE — 97140 MANUAL THERAPY 1/> REGIONS: CPT | Performed by: CHIROPRACTOR

## 2023-05-03 NOTE — FLOWSHEET NOTE
[facility], [facility]  [address] [facility], [facility]  Phone: [phone]   Fax:     [phone]        Physical Therapy Treatment Note/ Progress Report:       Date:  2023    Patient Name:  Celsa Narvaez    :  1965  MRN: [id number]    Pertinent Medical History:Additional Pertinent Hx: Anxiety, Depression    Medical/Treatment Diagnosis Information:  Medical Diagnosis: Nontraumatic complete tear of left rotator cuff [M75.122]  Treatment Diagnosis: Decreased ROM / Strength of L Shoulder    Insurance/Certification information:  PT Insurance Information: [facility]  Physician Information:  Natalya Payton MD  Plan of care signed (Y/N): Inbox    Date of Patient follow up with Physician:      Progress Report: []  Yes  [x]  No     Date Range for reporting period:  Beginnin/3/2023  Ending:      Progress report due (10 Rx/or 30 days whichever is less):      Recertification due (POC duration/ or 90 days whichever is less):      Visit # POC/Insurance Allowable Auth Needed   7 /  96 units by 6/10/23 [x]Yes    []No     Functional Outcomes Measure:      Test: UEFI  Date Assessed Score   4/10/23 43           Pain level: 0-1/10     History of Injury:    S/P L Rot cuff repair 3/17/23                                         Procedure:  Left shoulder Diagnostic arthroscopy, subacromial decompression, rotator cuff repair, biceps tenotomy    SUBJECTIVE:  23 - No complaints voiced  23 F/U with MD 23 . To PT with sling. Able to sleep in bed now.          OBJECTIVE:   Observation:   Test measurements:    ROM:  Date       Shldr flex     abd  ER        IR   P P P P   Eval 120 70 65 60   23 140 75 70    23 155 130 75                   RESTRICTIONS/PRECAUTIONS:     Exercises/Interventions:   Therapeutic Ex (61512)  Min: Resistance/Reps Notes/Cues  S/P L Rot cuff repair 3/17/23   UE ranger on wall 4

## 2023-05-08 ENCOUNTER — APPOINTMENT (OUTPATIENT)
Dept: PHYSICAL THERAPY | Age: 58
End: 2023-05-08
Payer: COMMERCIAL

## 2023-05-08 ENCOUNTER — HOSPITAL ENCOUNTER (OUTPATIENT)
Dept: PHYSICAL THERAPY | Age: 58
Setting detail: THERAPIES SERIES
Discharge: HOME | End: 2023-05-08
Payer: COMMERCIAL

## 2023-05-08 DIAGNOSIS — M75.122 NONTRAUMATIC COMPLETE TEAR OF LEFT ROTATOR CUFF: Primary | ICD-10-CM

## 2023-05-08 RX ORDER — HYDROCODONE BITARTRATE AND ACETAMINOPHEN 5; 325 MG/1; MG/1
1 TABLET ORAL 2 TIMES DAILY PRN
Qty: 14 TABLET | Refills: 0 | Status: SHIPPED | OUTPATIENT
Start: 2023-05-08 | End: 2023-05-08

## 2023-05-08 RX ORDER — HYDROCODONE BITARTRATE AND ACETAMINOPHEN 5; 325 MG/1; MG/1
1 TABLET ORAL 2 TIMES DAILY PRN
Qty: 14 TABLET | Refills: 0 | Status: SHIPPED | OUTPATIENT
Start: 2023-05-08 | End: 2023-05-15

## 2023-05-08 NOTE — FLOWSHEET NOTE
[facility], [facility]  [address] [facility] [place], [facility]  Phone: [phone]   Fax:     [phone]    Physical Therapy  Cancellation/No-show Note  Patient Name:  Echo Estrella  :  1965   Date:  2023  Cancelled visits to date: 1  No-shows to date: 0    Patient status for today's appointment patient:  [x]  Cancelled  []  Rescheduled appointment  []  No-show     Reason given by patient:  []  Patient ill  []  Conflicting appointment  []  No transportation    []  Conflict with work  [x]  No reason given  []  Other:     Comments:      Phone call information:   []  Phone call made today to patient at _ time at number provided:      []  Patient answered, conversation as follows:    []  Patient did not answer, message left as follows:  [x]  Phone call not made today    Electronically signed by:  Ej Virgen, PTA  [id number]

## 2023-05-08 NOTE — TELEPHONE ENCOUNTER
Patient last seen 5/2/2023 and medication last filled 3/27/2023:     Last lab result completed/reported on: >3year old

## 2023-05-08 NOTE — TELEPHONE ENCOUNTER
Prescription Refill     Medication Name:  OXYCODONE 5-325 MG  Pharmacy: [facility]  Patient Contact Number:   [phone]    PATIENT HAS 3 PILLS LEFT

## 2023-05-09 ENCOUNTER — APPOINTMENT (OUTPATIENT)
Dept: PHYSICAL THERAPY | Age: 58
End: 2023-05-09
Payer: COMMERCIAL

## 2023-05-11 ENCOUNTER — HOSPITAL ENCOUNTER (OUTPATIENT)
Dept: PHYSICAL THERAPY | Age: 58
Setting detail: THERAPIES SERIES
Discharge: HOME | End: 2023-05-11
Payer: COMMERCIAL

## 2023-05-11 PROCEDURE — 97140 MANUAL THERAPY 1/> REGIONS: CPT | Performed by: CHIROPRACTOR

## 2023-05-11 PROCEDURE — 97110 THERAPEUTIC EXERCISES: CPT | Performed by: CHIROPRACTOR

## 2023-05-11 NOTE — FLOWSHEET NOTE
[facility], [facility]  [address] [facility], [facility]  Phone: [phone]   Fax:     [phone]        Physical Therapy Treatment Note/ Progress Report:       Date:  2023    Patient Name:  Yojana Palma    :  1965  MRN: [id number]    Pertinent Medical History:Additional Pertinent Hx: Anxiety, Depression    Medical/Treatment Diagnosis Information:  Medical Diagnosis: Nontraumatic complete tear of left rotator cuff [M75.122]  Treatment Diagnosis: Decreased ROM / Strength of L Shoulder    Insurance/Certification information:  PT Insurance Information: [facility]  Physician Information:  Chloe Parham MD  Plan of care signed (Y/N): Inbox    Date of Patient follow up with Physician:      Progress Report: []  Yes  [x]  No     Date Range for reporting period:  Beginnin2023  Ending:      Progress report due (10 Rx/or 30 days whichever is less):      Recertification due (POC duration/ or 90 days whichever is less):      Visit # POC/Insurance Allowable Auth Needed   8 /  96 units by 6/10/23 [x]Yes    []No     Functional Outcomes Measure:      Test: UEFI  Date Assessed Score   4/10/23 43           Pain level: 2/10     History of Injury:    S/P L Rot cuff repair 3/17/23                                         Procedure:  Left shoulder Diagnostic arthroscopy, subacromial decompression, rotator cuff repair, biceps tenotomy    SUBJECTIVE:  23 - No complaints voiced  23 F/U with MD - . To PT with sling.   Able to sleep in bed now.   23 - Attributes increased soreness to returning to work today      OBJECTIVE:   Observation:   Test measurements:    ROM:  Date       Shldr flex     abd  ER        IR   P P P P   Eval 120 70 65 60   23 140 75 70    23 155 130 75                   RESTRICTIONS/PRECAUTIONS:     Exercises/Interventions:   Therapeutic Ex (75042)  Min:

## 2023-05-17 ENCOUNTER — HOSPITAL ENCOUNTER (OUTPATIENT)
Dept: PHYSICAL THERAPY | Age: 58
Setting detail: THERAPIES SERIES
Discharge: HOME | End: 2023-05-17
Payer: COMMERCIAL

## 2023-05-17 NOTE — FLOWSHEET NOTE
[facility], [facility]  [address] [facility] [place], [facility]  Phone: [phone]   Fax:     [phone]    Physical Therapy  Cancellation/No-show Note  Patient Name:  Enrique Dewitt  :  1965   Date:  2023  Cancelled visits to date: 1  No-shows to date: 1    Patient status for today's appointment patient:  [x]  Cancelled  []  Rescheduled appointment  []  No-show     Reason given by patient:  []  Patient ill  []  Conflicting appointment  []  No transportation    []  Conflict with work  []  No reason given  []  Other:     Comments:      Phone call information:   [x]  Phone call made today to patient at 4:50pm_ time at number provided:      []  Patient answered, conversation as follows:    [x]  Patient did not answer, message left as follows:called re: NS, left message with next apt date/ time with instructions to call in advance if unable to attend.      []  Phone call not made today    Electronically signed by:  Austin Sarkar, PTA  [id number]

## 2023-05-22 ENCOUNTER — HOSPITAL ENCOUNTER (OUTPATIENT)
Dept: PHYSICAL THERAPY | Age: 58
Setting detail: THERAPIES SERIES
Discharge: HOME | End: 2023-05-22
Payer: COMMERCIAL

## 2023-05-22 PROCEDURE — 97110 THERAPEUTIC EXERCISES: CPT

## 2023-05-22 PROCEDURE — 97140 MANUAL THERAPY 1/> REGIONS: CPT

## 2023-05-22 NOTE — FLOWSHEET NOTE
face-to-face)  [] EVAL (MOD) 93998 (typically 30 minutes face-to-face)  [] EVAL (HIGH) 29204 (typically 45 minutes face-to-face)  [] RE-EVAL     [x] PE(13468) x     [] Dry needle 1 or 2 Muscles (91770)  [] NMR (97361) x     [] Dry needle 3+ Muscles (35142)  [x] Manual (68466) x     [] Ultrasound (04865) x  [] TA (05996) x     [] Mech Traction (05291)  [] ES(attended) (63017)     [] ES (un) (47163):   [] Vasopump (91370) [] Ionto (25317)   [] Other:        Approval Dates:  CPT Code Units Approved Units Used  Date Updated:                     GOALS:  Patient stated goal: Return to work  [] Progressing: [] Met: [] Not Met: [] Adjusted    Therapist goals for Patient:   Short Term Goals: To be achieved in: 2 weeks  1. Independent in HEP and progression per patient tolerance, in order to prevent re-injury. [] Progressing: [] Met: [] Not Met: [] Adjusted  2. Patient will have a decrease in pain to facilitate improvement in movement, function, and ADLs as indicated by Functional Deficits. [] Progressing: [] Met: [] Not Met: [] Adjusted    Long Term Goals: To be achieved in: 8 weeks  1. Increase UEFI functional outcome score from 43 to > 60 to assist with reaching prior level of function. [] Progressing: [] Met: [] Not Met: [] Adjusted  2. Patient will demonstrate increased AROM to WNL to allow for proper joint functioning as indicated by Functional Deficits. [] Progressing: [] Met: [] Not Met: [] Adjusted  3. Patient will demonstrate an increase in [facility] and scapular strength  for proper functional mobility as indicated by patients Functional Deficits. [] Progressing: [] Met: [] Not Met: [] Adjusted  4. Patient will return to usual   activities without increased symptoms or restriction. [] Progressing: [] Met: [] Not Met: [] Adjusted    ASSESSMENT:  Tolerating AROM well  5-22-23 pt more soreness with supine arm press today, otherwise tolerated well  .      Treatment/Activity Tolerance:  [x] Patient tolerated

## 2023-05-23 ENCOUNTER — HOSPITAL ENCOUNTER (OUTPATIENT)
Dept: PHYSICAL THERAPY | Age: 58
Setting detail: THERAPIES SERIES
Discharge: HOME | End: 2023-05-23
Payer: COMMERCIAL

## 2023-05-23 PROCEDURE — 97110 THERAPEUTIC EXERCISES: CPT | Performed by: CHIROPRACTOR

## 2023-05-23 PROCEDURE — 97140 MANUAL THERAPY 1/> REGIONS: CPT | Performed by: CHIROPRACTOR

## 2023-05-23 NOTE — FLOWSHEET NOTE
[facility], [facility]  [address] [facility], [facility]  Phone: [phone]   Fax:     [phone]        Physical Therapy Treatment Note/ Progress Report:       Date:  2023    Patient Name:  Olivia Hernandez    :  1965  MRN: [id number]    Pertinent Medical History:Additional Pertinent Hx: Anxiety, Depression    Medical/Treatment Diagnosis Information:  Medical Diagnosis: Nontraumatic complete tear of left rotator cuff [M75.122]  Treatment Diagnosis: Decreased ROM / Strength of L Shoulder    Insurance/Certification information:  PT Insurance Information: [facility]  Physician Information:  Nakul Yoon MD  Plan of care signed (Y/N): Inbox    Date of Patient follow up with Physician:      Progress Report: []  Yes  [x]  No     Date Range for reporting period:  Beginnin2023  Ending:      Progress report due (10 Rx/or 30 days whichever is less):      Recertification due (POC duration/ or 90 days whichever is less):      Visit # POC/Insurance Allowable Auth Needed   10 /  96 units by 6/10/23 [x]Yes    []No     Functional Outcomes Measure:      Test: UEFI  Date Assessed Score   4/10/23 43           Pain level: 2/10     History of Injury:    S/P L Rot cuff repair 3/17/23                                         Procedure:  Left shoulder Diagnostic arthroscopy, subacromial decompression, rotator cuff repair, biceps tenotomy    SUBJECTIVE:  23 - No complaints voiced  23 F/U with MD 23 . To PT with sling.   Able to sleep in bed now.   23 - Attributes increased soreness to returning to work today      OBJECTIVE:   Observation:   Test measurements:    ROM:  Date       Shldr flex     abd  ER        IR   P P P P   Eval 120 70 65 60   23 140 75 70    23 155 130 75                          RESTRICTIONS/PRECAUTIONS:     Exercises/Interventions:   Therapeutic Ex (20872)  Min:

## 2023-05-25 ENCOUNTER — APPOINTMENT (OUTPATIENT)
Dept: PHYSICAL THERAPY | Age: 58
End: 2023-05-25
Payer: COMMERCIAL

## 2023-05-30 ENCOUNTER — HOSPITAL ENCOUNTER (OUTPATIENT)
Dept: PHYSICAL THERAPY | Age: 58
Setting detail: THERAPIES SERIES
Discharge: HOME | End: 2023-05-30
Payer: COMMERCIAL

## 2023-05-30 PROCEDURE — 97110 THERAPEUTIC EXERCISES: CPT | Performed by: CHIROPRACTOR

## 2023-05-30 PROCEDURE — 97140 MANUAL THERAPY 1/> REGIONS: CPT | Performed by: CHIROPRACTOR

## 2023-05-30 NOTE — FLOWSHEET NOTE
[facility], [facility]  [address] [facility], [facility]  Phone: [phone]   Fax:     [phone]        Physical Therapy Treatment Note/ Progress Report:       Date:  2023    Patient Name:  Rayshawn Sandoval    :  1965  MRN: [id number]    Pertinent Medical History:Additional Pertinent Hx: Anxiety, Depression    Medical/Treatment Diagnosis Information:  Medical Diagnosis: Nontraumatic complete tear of left rotator cuff [M75.122]  Treatment Diagnosis: Decreased ROM / Strength of L Shoulder    Insurance/Certification information:  PT Insurance Information: [facility]  Physician Information:  Teto Austin MD  Plan of care signed (Y/N): Inbox    Date of Patient follow up with Physician:      Progress Report: []  Yes  [x]  No     Date Range for reporting period:  Beginnin2023  Ending:      Progress report due (10 Rx/or 30 days whichever is less):      Recertification due (POC duration/ or 90 days whichever is less):      Visit # POC/Insurance Allowable Auth Needed    units by 6/10/23 [x]Yes    []No     Functional Outcomes Measure:      Test: UEFI  Date Assessed Score   4/10/23 43           Pain level: 2/10     History of Injury:    S/P L Rot cuff repair 3/17/23                                         Procedure:  Left shoulder Diagnostic arthroscopy, subacromial decompression, rotator cuff repair, biceps tenotomy    SUBJECTIVE:  23 - No complaints voiced  23 F/U with MD 23 . To PT with sling.   Able to sleep in bed now.   23 - Attributes increased soreness to returning to work today      OBJECTIVE:   Observation:   Test measurements:    ROM:  Date       Shldr flex     abd  ER        IR   P P P P   Eval 120 70 65 60   23 140 75 70    23 155 130 75    23 170 160 85                   RESTRICTIONS/PRECAUTIONS:     Exercises/Interventions:   Therapeutic Ex

## 2023-06-02 ENCOUNTER — HOSPITAL ENCOUNTER (OUTPATIENT)
Dept: PHYSICAL THERAPY | Age: 58
Setting detail: THERAPIES SERIES
Discharge: HOME | End: 2023-06-02
Payer: COMMERCIAL

## 2023-06-05 ENCOUNTER — HOSPITAL ENCOUNTER (OUTPATIENT)
Dept: PHYSICAL THERAPY | Age: 58
Setting detail: THERAPIES SERIES
Discharge: HOME | End: 2023-06-05
Payer: COMMERCIAL

## 2023-06-05 PROCEDURE — 97110 THERAPEUTIC EXERCISES: CPT

## 2023-06-05 PROCEDURE — 97140 MANUAL THERAPY 1/> REGIONS: CPT

## 2023-06-05 NOTE — FLOWSHEET NOTE
scapulothoracic and UE control with self care, reaching, carrying, lifting, house/yardwork, driving/computer work. Therapeutic Activities:    [] (31555 or 28579) Provided verbal/tactile cueing for activities related to improving balance, coordination, kinesthetic sense, posture, motor skill, proprioception and motor activation to allow for proper function of scapular, scapulothoracic and UE control with self care, carrying, lifting, driving/computer work. Home Exercise Program:    [x] (98286) Reviewed/Progressed HEP activities related to strengthening, flexibility, endurance, ROM of scapular, scapulothoracic and UE control with self care, reaching, carrying, lifting, house/yardwork, driving/computer work  [] (73011) Reviewed/Progressed HEP activities related to improving balance, coordination, kinesthetic sense, posture, motor skill, proprioception of scapular, scapulothoracic and UE control with self care, reaching, carrying, lifting, house/yardwork, driving/computer work      Manual Treatments:  PROM / STM / Oscillations-Mobs:  G-I, II, III, IV (PA's, Inf., Post.)  [] (94905) Provided manual therapy to mobilize soft tissue/joints of cervical/CT, scapular GHJ and UE for the purpose of modulating pain, promoting relaxation,  increasing ROM, reducing/eliminating soft tissue swelling/inflammation/restriction, improving soft tissue extensibility and allowing for proper ROM for normal function with self care, reaching, carrying, lifting, house/yardwork, driving/computer work    Modalities:  [] (09701) Vasopneumatic compression: Utilized vasopneumatic compression to decrease edema / swelling for the purpose of improving mobility and muscle tone / recruitment which will allow for increased overall function including but not limited to self-care, dressing, driving, and transfers.         Charges:  Timed Code Treatment Minutes: 30   Total Treatment Minutes: 30       [] GOMEZ (LOW) 79319 (typically 20 minutes

## 2023-06-07 ENCOUNTER — HOSPITAL ENCOUNTER (OUTPATIENT)
Dept: PHYSICAL THERAPY | Age: 58
Setting detail: THERAPIES SERIES
Discharge: HOME | End: 2023-06-07
Payer: COMMERCIAL

## 2023-06-07 PROCEDURE — 97140 MANUAL THERAPY 1/> REGIONS: CPT | Performed by: CHIROPRACTOR

## 2023-06-07 PROCEDURE — 97110 THERAPEUTIC EXERCISES: CPT | Performed by: CHIROPRACTOR

## 2023-06-07 NOTE — FLOWSHEET NOTE
with minimal increased anterior shoulder discomfort with some exer  6-5-23 progressing well with ROM, ADL's improving. Treatment/Activity Tolerance:  [x] Patient tolerated treatment well [] Patient limited by fatique  [] Patient limited by pain  [] Patient limited by other medical complications  [] Other:     Overall Progression Towards Functional goals/ Treatment Progress Update:  [] Patient is progressing as expected towards functional goals listed. [] Progression is slowed due to complexities/Impairments listed. [] Progression has been slowed due to co-morbidities. [x] Plan just implemented, too soon to assess goals progression <30days   [] Goals require adjustment due to lack of progress  [] Patient is not progressing as expected and requires additional follow up with physician  [] Other    Prognosis for POC: [x] Good [] Fair  [] Poor    Patient requires continued skilled intervention: [x] Yes  [] No      PLAN: Cont for improved strengthening  [] Continue per plan of care [] Alter current plan (see comments)  [x] Plan of care initiated [] Hold pending MD visit [] Discharge    Electronically signed by: Williams LOPES#[id number]    Note: If patient does not return for scheduled/recommended follow up visits, this note will serve as a discharge from care along with the most recent update on progress.

## 2023-06-27 ENCOUNTER — OFFICE VISIT (OUTPATIENT)
Dept: ORTHOPEDIC SURGERY | Age: 58
End: 2023-06-27
Payer: COMMERCIAL

## 2023-06-27 VITALS — BODY MASS INDEX: 25.1 KG/M2 | WEIGHT: 147 LBS | HEIGHT: 64 IN | RESPIRATION RATE: 16 BRPM

## 2023-06-27 DIAGNOSIS — M75.122 NONTRAUMATIC COMPLETE TEAR OF LEFT ROTATOR CUFF: Primary | ICD-10-CM

## 2023-06-27 PROCEDURE — 4004F PT TOBACCO SCREEN RCVD TLK: CPT | Performed by: ORTHOPAEDIC SURGERY

## 2023-06-27 PROCEDURE — 3017F COLORECTAL CA SCREEN DOC REV: CPT | Performed by: ORTHOPAEDIC SURGERY

## 2023-06-27 PROCEDURE — G8419 CALC BMI OUT NRM PARAM NOF/U: HCPCS | Performed by: ORTHOPAEDIC SURGERY

## 2023-06-27 PROCEDURE — G8427 DOCREV CUR MEDS BY ELIG CLIN: HCPCS | Performed by: ORTHOPAEDIC SURGERY

## 2023-06-27 PROCEDURE — 99213 OFFICE O/P EST LOW 20 MIN: CPT | Performed by: ORTHOPAEDIC SURGERY

## 2024-03-11 ENCOUNTER — OFFICE VISIT (OUTPATIENT)
Dept: SLEEP MEDICINE | Age: 59
End: 2024-03-11
Payer: COMMERCIAL

## 2024-03-11 VITALS
OXYGEN SATURATION: 98 % | HEIGHT: 64 IN | WEIGHT: 149.2 LBS | DIASTOLIC BLOOD PRESSURE: 84 MMHG | BODY MASS INDEX: 25.47 KG/M2 | SYSTOLIC BLOOD PRESSURE: 126 MMHG | RESPIRATION RATE: 18 BRPM | HEART RATE: 68 BPM | TEMPERATURE: 97 F

## 2024-03-11 DIAGNOSIS — R06.89 GASPING FOR BREATH: ICD-10-CM

## 2024-03-11 DIAGNOSIS — G47.33 OSA (OBSTRUCTIVE SLEEP APNEA): Primary | ICD-10-CM

## 2024-03-11 DIAGNOSIS — R06.83 SNORING: ICD-10-CM

## 2024-03-11 DIAGNOSIS — R53.83 FATIGUE, UNSPECIFIED TYPE: ICD-10-CM

## 2024-03-11 DIAGNOSIS — G47.19 EXCESSIVE DAYTIME SLEEPINESS: ICD-10-CM

## 2024-03-11 PROCEDURE — 3017F COLORECTAL CA SCREEN DOC REV: CPT | Performed by: PSYCHIATRY & NEUROLOGY

## 2024-03-11 PROCEDURE — G8484 FLU IMMUNIZE NO ADMIN: HCPCS | Performed by: PSYCHIATRY & NEUROLOGY

## 2024-03-11 PROCEDURE — 4004F PT TOBACCO SCREEN RCVD TLK: CPT | Performed by: PSYCHIATRY & NEUROLOGY

## 2024-03-11 PROCEDURE — 99204 OFFICE O/P NEW MOD 45 MIN: CPT | Performed by: PSYCHIATRY & NEUROLOGY

## 2024-03-11 PROCEDURE — G8419 CALC BMI OUT NRM PARAM NOF/U: HCPCS | Performed by: PSYCHIATRY & NEUROLOGY

## 2024-03-11 PROCEDURE — G8427 DOCREV CUR MEDS BY ELIG CLIN: HCPCS | Performed by: PSYCHIATRY & NEUROLOGY

## 2024-03-11 ASSESSMENT — SLEEP AND FATIGUE QUESTIONNAIRES
HOW LIKELY ARE YOU TO NOD OFF OR FALL ASLEEP WHILE SITTING AND READING: 3
HOW LIKELY ARE YOU TO NOD OFF OR FALL ASLEEP WHILE WATCHING TV: 3
NECK CIRCUMFERENCE (INCHES): 14.5
HOW LIKELY ARE YOU TO NOD OFF OR FALL ASLEEP WHILE SITTING INACTIVE IN A PUBLIC PLACE: 0
HOW LIKELY ARE YOU TO NOD OFF OR FALL ASLEEP WHILE LYING DOWN TO REST IN THE AFTERNOON WHEN CIRCUMSTANCES PERMIT: 1
HOW LIKELY ARE YOU TO NOD OFF OR FALL ASLEEP WHILE SITTING QUIETLY AFTER LUNCH WITHOUT ALCOHOL: 1
ESS TOTAL SCORE: 8
HOW LIKELY ARE YOU TO NOD OFF OR FALL ASLEEP WHILE SITTING AND TALKING TO SOMEONE: 0
HOW LIKELY ARE YOU TO NOD OFF OR FALL ASLEEP WHEN YOU ARE A PASSENGER IN A CAR FOR AN HOUR WITHOUT A BREAK: 0
HOW LIKELY ARE YOU TO NOD OFF OR FALL ASLEEP IN A CAR, WHILE STOPPED FOR A FEW MINUTES IN TRAFFIC: 0

## 2024-03-11 ASSESSMENT — ENCOUNTER SYMPTOMS
NAUSEA: 1
CHOKING: 1
SORE THROAT: 1
EYES NEGATIVE: 1

## 2024-03-11 NOTE — PATIENT INSTRUCTIONS
Orders Placed This Encounter   Procedures    Baseline Diagnostic Sleep Study     Standing Status:   Future     Standing Expiration Date:   3/11/2025     Order Specific Question:   Adult or Pediatric     Answer:   Adult Study (>7 Years)     Order Specific Question:   Location For Sleep Study     Answer:   [place]     Order Specific Question:   Select Sleep Lab Location     Answer:   [facility]    Sleep Study with PAP Titration     Standing Status:   Future     Standing Expiration Date:   3/11/2025     Order Specific Question:   Sleep Study Titration Type     Answer:   CPAP     Order Specific Question:   Location For Sleep Study     Answer:   [place]     Order Specific Question:   Select Sleep Lab Location     Answer:   [facility]

## 2024-03-11 NOTE — PROGRESS NOTES
is not refreshing nap.     Previous evaluation and treatment has included- none.    DOT/CDL - N/A  FAA/'slicense - N/A  The patient HTN is stable.     Previous Report(s) Reviewed: historical medical records       Social History     Socioeconomic History    Marital status:      Spouse name: Not on file    Number of children: Not on file    Years of education: Not on file    Highest education level: Not on file   Occupational History    Occupation:    Tobacco Use    Smoking status: Every Day     Current packs/day: 0.50     Average packs/day: 0.5 packs/day for 30.0 years (15.0 ttl pk-yrs)     Types: Cigarettes     Passive exposure: Current    Smokeless tobacco: Former     Quit date: 7/11/2016    Tobacco comments:     cessation 6/14, 10/15   Vaping Use    Vaping Use: Never used   Substance and Sexual Activity    Alcohol use: Yes     Alcohol/week: 0.0 standard drinks of alcohol     Comment: socially    Drug use: Yes     Types: Marijuana (Weed)     Comment: smokes 1-2 gm per week    Sexual activity: Yes     Partners: Male   Other Topics Concern    Not on file   Social History Narrative    Not on file     Social Determinants of Health     Financial Resource Strain: Not on file   Food Insecurity: Not on file   Transportation Needs: Not on file   Physical Activity: Not on file   Stress: Not on file   Social Connections: Not on file   Intimate Partner Violence: Not on file   Housing Stability: Not on file       Prior to Admission medications    Medication Sig Start Date End Date Taking? Authorizing Provider   diazePAM (VALIUM) 5 MG tablet TAKE 1 TABLET BY MOUTH TWO TIMES A DAY AS NEEDED 6/13/22  Yes ProviderHerrera MD   lisinopril (PRINIVIL;ZESTRIL) 20 MG tablet Take 1 tablet by mouth nightly 8/7/22  Yes Herrera Roa MD   traZODone (DESYREL) 50 MG tablet TAKE 1 TABLET BY MOUTH AT BEDTIME 8/11/22  Yes Herrera Roa MD   Ibuprofen (MOTRIN PO) Take by mouth    Herrera Roa MD

## 2024-03-21 ENCOUNTER — HOSPITAL ENCOUNTER (OUTPATIENT)
Dept: SLEEP CENTER | Age: 59
Discharge: HOME | End: 2024-03-21
Attending: PSYCHIATRY & NEUROLOGY
Payer: COMMERCIAL

## 2024-03-21 DIAGNOSIS — G47.33 OSA (OBSTRUCTIVE SLEEP APNEA): ICD-10-CM

## 2024-03-21 PROCEDURE — 95810 POLYSOM 6/> YRS 4/> PARAM: CPT

## 2024-03-25 PROBLEM — R09.02 HYPOXEMIA: Status: ACTIVE | Noted: 2024-03-25

## 2024-03-25 PROBLEM — G47.10 HYPERSOMNIA: Status: ACTIVE | Noted: 2024-03-25

## 2024-03-27 ENCOUNTER — TELEPHONE (OUTPATIENT)
Dept: PULMONOLOGY | Age: 59
End: 2024-03-27

## 2024-03-27 NOTE — TELEPHONE ENCOUNTER
----- Message from Moisés Wilson DO sent at 3/25/2024 11:01 AM EDT -----  Antonio referred her to me due to low oxygen during sleep study.  She tested negative for sleep apnea.  Ok to add whenever    Thanks   ----- Message -----  From: Jesse Alvarez MD  Sent: 3/25/2024  10:56 AM EDT  To: Moisés Wilson DO    Nocturnal hypoxemia in PSG

## 2024-04-25 ENCOUNTER — OFFICE VISIT (OUTPATIENT)
Dept: PULMONOLOGY | Age: 59
End: 2024-04-25
Payer: COMMERCIAL

## 2024-04-25 VITALS
DIASTOLIC BLOOD PRESSURE: 76 MMHG | HEART RATE: 76 BPM | OXYGEN SATURATION: 98 % | SYSTOLIC BLOOD PRESSURE: 115 MMHG | BODY MASS INDEX: 25.33 KG/M2 | TEMPERATURE: 97.6 F | WEIGHT: 148.4 LBS | HEIGHT: 64 IN | RESPIRATION RATE: 18 BRPM

## 2024-04-25 DIAGNOSIS — Z87.891 PERSONAL HISTORY OF TOBACCO USE: ICD-10-CM

## 2024-04-25 DIAGNOSIS — J32.9 RHINOSINUSITIS: ICD-10-CM

## 2024-04-25 DIAGNOSIS — G47.34 NOCTURNAL HYPOXIA: Primary | ICD-10-CM

## 2024-04-25 PROCEDURE — G8419 CALC BMI OUT NRM PARAM NOF/U: HCPCS | Performed by: INTERNAL MEDICINE

## 2024-04-25 PROCEDURE — 99204 OFFICE O/P NEW MOD 45 MIN: CPT | Performed by: INTERNAL MEDICINE

## 2024-04-25 PROCEDURE — G8427 DOCREV CUR MEDS BY ELIG CLIN: HCPCS | Performed by: INTERNAL MEDICINE

## 2024-04-25 PROCEDURE — G0296 VISIT TO DETERM LDCT ELIG: HCPCS | Performed by: INTERNAL MEDICINE

## 2024-04-25 PROCEDURE — 3017F COLORECTAL CA SCREEN DOC REV: CPT | Performed by: INTERNAL MEDICINE

## 2024-04-25 PROCEDURE — 4004F PT TOBACCO SCREEN RCVD TLK: CPT | Performed by: INTERNAL MEDICINE

## 2024-04-25 RX ORDER — VARENICLINE TARTRATE 1 MG/1
1 TABLET, FILM COATED ORAL 2 TIMES DAILY
Qty: 60 TABLET | Refills: 3 | Status: SHIPPED | OUTPATIENT
Start: 2024-04-25

## 2024-04-25 RX ORDER — VARENICLINE TARTRATE 0.5 (11)-1
1 KIT ORAL DAILY
Qty: 42 EACH | Refills: 0 | Status: SHIPPED | OUTPATIENT
Start: 2024-04-25

## 2024-04-25 RX ORDER — NICOTINE 21 MG/24HR
1 PATCH, TRANSDERMAL 24 HOURS TRANSDERMAL DAILY
Qty: 42 PATCH | Refills: 1 | Status: SHIPPED | OUTPATIENT
Start: 2024-04-25

## 2024-04-25 RX ORDER — MONTELUKAST SODIUM 10 MG/1
10 TABLET ORAL DAILY
Qty: 90 TABLET | Refills: 1 | Status: SHIPPED | OUTPATIENT
Start: 2024-04-25

## 2024-04-25 NOTE — PROGRESS NOTES
Discussed with patient the risks and benefits of screening, including over-diagnosis, false positive rate, and total radiation exposure.  The patient currently exhibits no signs or symptoms suggestive of lung cancer.  Discussed with patient the importance of compliance with yearly annual lung cancer screenings and willingness to undergo diagnosis and treatment if screening scan is positive.  In addition, the patient was counseled regarding the importance of remaining smoke free and/or total smoking cessation.    Also reviewed the following if the patient has Medicare that as of February 10, 2022, Medicare only covers LDCT screening in patients aged 50-77 with at least a 20 pack-year smoking history who currently smoke or have quit in the last 15 years

## 2024-04-25 NOTE — PATIENT INSTRUCTIONS
Full breathing studies    Ct Chest    Start chantix and nicotine patch    Follow up in 2 months      Learning About Lung Cancer Screening  What is screening for lung cancer?     Lung cancer screening is a way to find some lung cancers early, before a person has any symptoms of the cancer.  Lung cancer screening may help those who have the highest risk for lung cancer--people age 50 and older who are or were heavy smokers. For most people, who aren't at increased risk, screening for lung cancer probably isn't helpful.  Screening won't prevent cancer. And it may not find all lung cancers. Lung cancer screening may lower the risk of dying from lung cancer in a small number of people.  How is it done?  Lung cancer screening is done with a low-dose CT (computed tomography) scan. A CT scan uses X-rays, or radiation, to make detailed pictures of your body. Experts recommend that screening be done in medical centers that focus on finding and treating lung cancer.  Who is screening recommended for?  Lung cancer screening is recommended for people age 50 and older who are or were heavy smokers. That means people with a smoking history of at least 20 pack years. A pack year is a way to measure how heavy a smoker you are or were.  To figure out your pack years, multiply how many packs a day on average (assuming 20 cigarettes per pack) you have smoked by how many years you have smoked. For example:  If you smoked 1 pack a day for 20 years, that's 1 times 20. So you have a smoking history of 20 pack years.  If you smoked 2 packs a day for 10 years, that's 2 times 10. So you have a smoking history of 20 pack years.  Experts agree that screening is for people who have a high risk of lung cancer. But experts don't agree on what high risk means. Some say people age 50 or older with at least a 20-pack-year smoking history are high risk. Others say it's people age 55 or older with a 30-pack-year history.  To see if you could benefit

## 2024-05-06 ENCOUNTER — HOSPITAL ENCOUNTER (OUTPATIENT)
Dept: PULMONOLOGY | Age: 59
Discharge: HOME | End: 2024-05-06
Attending: INTERNAL MEDICINE
Payer: COMMERCIAL

## 2024-05-06 ENCOUNTER — HOSPITAL ENCOUNTER (OUTPATIENT)
Dept: CT IMAGING | Age: 59
Discharge: HOME | End: 2024-05-06
Attending: INTERNAL MEDICINE
Payer: COMMERCIAL

## 2024-05-06 DIAGNOSIS — Z87.891 PERSONAL HISTORY OF TOBACCO USE: ICD-10-CM

## 2024-05-06 DIAGNOSIS — G47.34 NOCTURNAL HYPOXIA: ICD-10-CM

## 2024-05-06 LAB
DLCO %PRED: 116 %
DLCO PRED: NORMAL
DLCO/VA %PRED: NORMAL
DLCO/VA PRED: NORMAL
DLCO/VA: NORMAL
DLCO: NORMAL
EXPIRATORY TIME-POST: NORMAL
EXPIRATORY TIME: NORMAL
FEF 25-75 %CHNG: NORMAL
FEF 25-75 POST %PRED: NORMAL
FEF 25-75% %PRED-PRE: NORMAL
FEF 25-75% PRED: NORMAL
FEF 25-75-POST: NORMAL
FEF 25-75-PRE: NORMAL
FEV1 %PRED-POST: 83 %
FEV1 %PRED-PRE: 73 %
FEV1 PRED: NORMAL
FEV1-POST: NORMAL
FEV1-PRE: NORMAL
FEV1/FVC %PRED-POST: 77 %
FEV1/FVC %PRED-PRE: 69 %
FEV1/FVC PRED: NORMAL
FEV1/FVC-POST: NORMAL
FEV1/FVC-PRE: NORMAL
FVC %PRED-POST: NORMAL
FVC %PRED-PRE: NORMAL
FVC PRED: NORMAL
FVC-POST: NORMAL
FVC-PRE: NORMAL
GAW %PRED: NORMAL
GAW PRED: NORMAL
GAW: NORMAL
IC PRE %PRED: NORMAL
IC PRED: NORMAL
IC: NORMAL
MEP: NORMAL
MIP: NORMAL
MVV %PRED-PRE: NORMAL
MVV PRED: NORMAL
MVV-PRE: NORMAL
PEF %PRED-POST: NORMAL
PEF %PRED-PRE: NORMAL
PEF PRED: NORMAL
PEF%CHNG: NORMAL
PEF-POST: NORMAL
PEF-PRE: NORMAL
RAW %PRED: NORMAL
RAW PRED: NORMAL
RAW: NORMAL
RV PRE %PRED: NORMAL
RV PRED: NORMAL
RV: NORMAL
SVC %PRED: NORMAL
SVC PRED: NORMAL
SVC: NORMAL
TLC PRE %PRED: 85 %
TLC PRED: NORMAL
TLC: NORMAL
VA %PRED: NORMAL
VA PRED: NORMAL
VA: NORMAL
VTG %PRED: NORMAL
VTG PRED: NORMAL
VTG: NORMAL

## 2024-05-06 PROCEDURE — 94729 DIFFUSING CAPACITY: CPT

## 2024-05-06 PROCEDURE — 94726 PLETHYSMOGRAPHY LUNG VOLUMES: CPT

## 2024-05-06 PROCEDURE — 71271 CT THORAX LUNG CANCER SCR C-: CPT

## 2024-05-06 PROCEDURE — 94664 DEMO&/EVAL PT USE INHALER: CPT

## 2024-05-06 PROCEDURE — 94640 AIRWAY INHALATION TREATMENT: CPT

## 2024-05-06 PROCEDURE — 6370000000 HC RX 637 (ALT 250 FOR IP): Performed by: INTERNAL MEDICINE

## 2024-05-06 PROCEDURE — 94060 EVALUATION OF WHEEZING: CPT

## 2024-05-06 RX ORDER — ALBUTEROL SULFATE 90 UG/1
4 AEROSOL, METERED RESPIRATORY (INHALATION) ONCE
Status: COMPLETED | OUTPATIENT
Start: 2024-05-06 | End: 2024-05-06

## 2024-05-06 RX ADMIN — ALBUTEROL SULFATE 4 PUFF: 90 AEROSOL, METERED RESPIRATORY (INHALATION) at 08:23

## 2024-05-06 ASSESSMENT — PULMONARY FUNCTION TESTS
FEV1/FVC_PERCENT_PREDICTED_POST: 77
FEV1_PERCENT_PREDICTED_PRE: 73
FEV1/FVC_PERCENT_PREDICTED_PRE: 69
FEV1_PERCENT_PREDICTED_POST: 83

## 2024-05-09 PROCEDURE — 94729 DIFFUSING CAPACITY: CPT | Performed by: INTERNAL MEDICINE

## 2024-05-09 PROCEDURE — 94060 EVALUATION OF WHEEZING: CPT | Performed by: INTERNAL MEDICINE

## 2024-05-09 PROCEDURE — 94726 PLETHYSMOGRAPHY LUNG VOLUMES: CPT | Performed by: INTERNAL MEDICINE

## 2024-05-09 NOTE — PROCEDURES
Spirometry was acceptable and reproducible by ATS standards      Spirometry/Flow volume loop:  Spirometry and flow volume loops consistent with reversible airflow obstruction based on gold cutoff.  But without a significant bronchodilator response.. FEV1 of 83%, and FVC 85%.       Lung volumes:  Lung volumes without hyperinflation or air trapping.  Diffusing capacity:  Diffusion capacity within normal limits.    Summary:  Prebronchodilator spirometry technically meets gold cutoff for airflow obstruction but this resolves with bronchodilator.      FEV1 %Pred-Post   Date Value Ref Range Status   05/06/2024 83 % Final     TLC Pre %Pred   Date Value Ref Range Status   05/06/2024 85 % Final     DLCO %Pred   Date Value Ref Range Status   05/06/2024 116 % Final       PFT data will be scanned into the media tab under this encounter. Please see the scanned data for numerical values.     Ronni Hurtado MD  [facility] Pulmonary, Sleep and Critical Care Medicine

## 2024-05-20 DIAGNOSIS — Z87.891 PERSONAL HISTORY OF TOBACCO USE: ICD-10-CM

## 2024-05-20 DIAGNOSIS — G47.34 NOCTURNAL HYPOXIA: ICD-10-CM

## 2024-05-20 RX ORDER — VARENICLINE TARTRATE 0.5 (11)-1
KIT ORAL
Refills: 0 | OUTPATIENT
Start: 2024-05-20

## 2024-05-24 DIAGNOSIS — G47.34 NOCTURNAL HYPOXIA: ICD-10-CM

## 2024-05-24 DIAGNOSIS — Z87.891 PERSONAL HISTORY OF TOBACCO USE: ICD-10-CM

## 2024-05-28 RX ORDER — VARENICLINE TARTRATE 1 MG/1
1 TABLET, FILM COATED ORAL 2 TIMES DAILY
Qty: 60 TABLET | Refills: 3 | OUTPATIENT
Start: 2024-05-28

## 2024-06-05 DIAGNOSIS — G47.34 NOCTURNAL HYPOXIA: ICD-10-CM

## 2024-06-05 DIAGNOSIS — Z87.891 PERSONAL HISTORY OF TOBACCO USE: ICD-10-CM

## 2024-06-06 RX ORDER — VARENICLINE TARTRATE 0.5 (11)-1
KIT ORAL
Qty: 1 EACH | Refills: 0 | OUTPATIENT
Start: 2024-06-06

## 2024-07-10 ENCOUNTER — OFFICE VISIT (OUTPATIENT)
Dept: PULMONOLOGY | Age: 59
End: 2024-07-10
Payer: COMMERCIAL

## 2024-07-10 ENCOUNTER — HOSPITAL ENCOUNTER (OUTPATIENT)
Dept: GENERAL RADIOLOGY | Age: 59
Discharge: HOME | End: 2024-07-10
Payer: COMMERCIAL

## 2024-07-10 ENCOUNTER — HOSPITAL ENCOUNTER (OUTPATIENT)
Age: 59
Discharge: HOME | End: 2024-07-10
Payer: COMMERCIAL

## 2024-07-10 ENCOUNTER — TRANSCRIBE ORDERS (OUTPATIENT)
Dept: GENERAL RADIOLOGY | Age: 59
End: 2024-07-10

## 2024-07-10 VITALS
TEMPERATURE: 97.7 F | WEIGHT: 145.9 LBS | DIASTOLIC BLOOD PRESSURE: 82 MMHG | BODY MASS INDEX: 24.91 KG/M2 | RESPIRATION RATE: 18 BRPM | SYSTOLIC BLOOD PRESSURE: 120 MMHG | HEIGHT: 64 IN | OXYGEN SATURATION: 95 % | HEART RATE: 74 BPM

## 2024-07-10 DIAGNOSIS — J44.89 COPD WITH ASTHMA (HCC): Primary | ICD-10-CM

## 2024-07-10 DIAGNOSIS — Z87.891 PERSONAL HISTORY OF TOBACCO USE: ICD-10-CM

## 2024-07-10 DIAGNOSIS — G47.34 NOCTURNAL HYPOXIA: ICD-10-CM

## 2024-07-10 PROCEDURE — 3023F SPIROM DOC REV: CPT | Performed by: INTERNAL MEDICINE

## 2024-07-10 PROCEDURE — 3017F COLORECTAL CA SCREEN DOC REV: CPT | Performed by: INTERNAL MEDICINE

## 2024-07-10 PROCEDURE — 99214 OFFICE O/P EST MOD 30 MIN: CPT | Performed by: INTERNAL MEDICINE

## 2024-07-10 PROCEDURE — 4004F PT TOBACCO SCREEN RCVD TLK: CPT | Performed by: INTERNAL MEDICINE

## 2024-07-10 PROCEDURE — G8419 CALC BMI OUT NRM PARAM NOF/U: HCPCS | Performed by: INTERNAL MEDICINE

## 2024-07-10 PROCEDURE — G8427 DOCREV CUR MEDS BY ELIG CLIN: HCPCS | Performed by: INTERNAL MEDICINE

## 2024-07-10 PROCEDURE — 73110 X-RAY EXAM OF WRIST: CPT

## 2024-07-10 RX ORDER — ALBUTEROL SULFATE 90 UG/1
2 AEROSOL, METERED RESPIRATORY (INHALATION) EVERY 4 HOURS PRN
Qty: 18 G | Refills: 5 | Status: SHIPPED | OUTPATIENT
Start: 2024-07-10

## 2024-07-10 NOTE — PROGRESS NOTES
Chief complaint  This is a 59 y.o. year old female  who comes to see me with a chief complaint of   Chief Complaint   Patient presents with    Follow-up    hypoxia    Snoring       HPI  Here with a cc of nocturnal hypoxia, COPD    She is doing well. She quit tobacco.  She did PFT which showed findings more consistent with asthma and did CT screen with mild emphysema.  She feels fine for the  most part.  Not on any inhalers.  Was able to quit tobacco with chantix.  Does not feel the need to start oxygen at night.  Still with sinus issues.  Singulair did not seem to do much                 Current Outpatient Medications:     albuterol sulfate HFA (PROVENTIL;VENTOLIN;PROAIR) 108 (90 Base) MCG/ACT inhaler, Inhale 2 puffs into the lungs every 4 hours as needed for Wheezing or Shortness of Breath (or cough), Disp: 18 g, Rfl: 5    Varenicline Tartrate, Starter, (CHANTIX STARTING MONTH MAK) 0.5 MG X 11 & 1 MG X 42 TBPK, Take 1 dose pack by mouth daily, Disp: 42 each, Rfl: 0    varenicline (CHANTIX CONTINUING MONTH MAK) 1 MG tablet, Take 1 tablet by mouth 2 times daily, Disp: 60 tablet, Rfl: 3    montelukast (SINGULAIR) 10 MG tablet, Take 1 tablet by mouth daily, Disp: 90 tablet, Rfl: 1    nicotine (NICODERM CQ) 14 MG/24HR, Place 1 patch onto the skin daily, Disp: 42 patch, Rfl: 1    Ibuprofen (MOTRIN PO), Take by mouth, Disp: , Rfl:     atorvastatin (LIPITOR) 20 MG tablet, TAKE 1 TABLET BY MOUTH AT BEDTIME, Disp: , Rfl:     buPROPion (WELLBUTRIN XL) 300 MG extended release tablet, TAKE 1 TABLET BY MOUTH EVERY DAY, Disp: , Rfl:     diazePAM (VALIUM) 5 MG tablet, TAKE 1 TABLET BY MOUTH TWO TIMES A DAY AS NEEDED, Disp: , Rfl:     lisinopril (PRINIVIL;ZESTRIL) 20 MG tablet, Take 1 tablet by mouth nightly, Disp: , Rfl:     traZODone (DESYREL) 50 MG tablet, TAKE 1 TABLET BY MOUTH AT BEDTIME, Disp: , Rfl:     PARoxetine (PAXIL) 20 MG tablet, Take 1 tablet by mouth every morning, Disp: , Rfl:       PHYSICAL EXAM:  Vitals:     no

## 2025-01-13 ENCOUNTER — OFFICE VISIT (OUTPATIENT)
Dept: PULMONOLOGY | Age: 60
End: 2025-01-13
Payer: COMMERCIAL

## 2025-01-13 VITALS
SYSTOLIC BLOOD PRESSURE: 105 MMHG | HEART RATE: 80 BPM | HEIGHT: 64 IN | BODY MASS INDEX: 26.43 KG/M2 | WEIGHT: 154.8 LBS | TEMPERATURE: 98 F | DIASTOLIC BLOOD PRESSURE: 74 MMHG | RESPIRATION RATE: 18 BRPM | OXYGEN SATURATION: 97 %

## 2025-01-13 DIAGNOSIS — Z87.891 PERSONAL HISTORY OF TOBACCO USE: ICD-10-CM

## 2025-01-13 DIAGNOSIS — G47.34 NOCTURNAL HYPOXIA: ICD-10-CM

## 2025-01-13 DIAGNOSIS — J44.89 COPD WITH ASTHMA (HCC): Primary | ICD-10-CM

## 2025-01-13 PROCEDURE — 4004F PT TOBACCO SCREEN RCVD TLK: CPT | Performed by: INTERNAL MEDICINE

## 2025-01-13 PROCEDURE — 99214 OFFICE O/P EST MOD 30 MIN: CPT | Performed by: INTERNAL MEDICINE

## 2025-01-13 PROCEDURE — 3017F COLORECTAL CA SCREEN DOC REV: CPT | Performed by: INTERNAL MEDICINE

## 2025-01-13 PROCEDURE — G8419 CALC BMI OUT NRM PARAM NOF/U: HCPCS | Performed by: INTERNAL MEDICINE

## 2025-01-13 PROCEDURE — 3023F SPIROM DOC REV: CPT | Performed by: INTERNAL MEDICINE

## 2025-01-13 PROCEDURE — G8427 DOCREV CUR MEDS BY ELIG CLIN: HCPCS | Performed by: INTERNAL MEDICINE

## 2025-01-13 NOTE — PROGRESS NOTES
Chief complaint  This is a 59 y.o. year old female  who comes to see me with a chief complaint of   Chief Complaint   Patient presents with    Follow-up    COPD    Asthma    Cough     And coughing up phlegm that is brownish in color       HPI  Here with a cc of nocturnal hypoxia, COPD    She is doing ok during the day.  Fighting off a sinus infection.  Improving  Restless sleeping.  No other changes.  Still does not feel the need to retest for night time oxygen     Has remained tobacco free       Current Outpatient Medications:     albuterol sulfate HFA (PROVENTIL;VENTOLIN;PROAIR) 108 (90 Base) MCG/ACT inhaler, Inhale 2 puffs into the lungs every 4 hours as needed for Wheezing or Shortness of Breath (or cough), Disp: 18 g, Rfl: 5    Varenicline Tartrate, Starter, (CHANTIX STARTING MONTH MAK) 0.5 MG X 11 & 1 MG X 42 TBPK, Take 1 dose pack by mouth daily, Disp: 42 each, Rfl: 0    varenicline (CHANTIX CONTINUING MONTH MAK) 1 MG tablet, Take 1 tablet by mouth 2 times daily, Disp: 60 tablet, Rfl: 3    montelukast (SINGULAIR) 10 MG tablet, Take 1 tablet by mouth daily, Disp: 90 tablet, Rfl: 1    nicotine (NICODERM CQ) 14 MG/24HR, Place 1 patch onto the skin daily, Disp: 42 patch, Rfl: 1    Ibuprofen (MOTRIN PO), Take by mouth, Disp: , Rfl:     atorvastatin (LIPITOR) 20 MG tablet, TAKE 1 TABLET BY MOUTH AT BEDTIME, Disp: , Rfl:     buPROPion (WELLBUTRIN XL) 300 MG extended release tablet, TAKE 1 TABLET BY MOUTH EVERY DAY, Disp: , Rfl:     diazePAM (VALIUM) 5 MG tablet, TAKE 1 TABLET BY MOUTH TWO TIMES A DAY AS NEEDED, Disp: , Rfl:     lisinopril (PRINIVIL;ZESTRIL) 20 MG tablet, Take 1 tablet by mouth nightly, Disp: , Rfl:     traZODone (DESYREL) 50 MG tablet, TAKE 1 TABLET BY MOUTH AT BEDTIME, Disp: , Rfl:     PARoxetine (PAXIL) 20 MG tablet, Take 1 tablet by mouth every morning, Disp: , Rfl:       PHYSICAL EXAM:  Vitals:    01/13/25 1154   BP: 105/74   Pulse: 80   Resp: 18   Temp: 98 °F (36.7 °C)   SpO2: 97%

## 2025-02-05 ENCOUNTER — HOSPITAL ENCOUNTER (OUTPATIENT)
Dept: WOMENS IMAGING | Age: 60
Discharge: HOME | End: 2025-02-05
Payer: COMMERCIAL

## 2025-02-05 VITALS — WEIGHT: 140 LBS | HEIGHT: 64 IN | BODY MASS INDEX: 23.9 KG/M2

## 2025-02-05 DIAGNOSIS — Z12.31 SCREENING MAMMOGRAM FOR BREAST CANCER: ICD-10-CM

## 2025-02-05 PROCEDURE — 77063 BREAST TOMOSYNTHESIS BI: CPT

## 2025-06-15 ENCOUNTER — TELEPHONE (OUTPATIENT)
Dept: CASE MANAGEMENT | Age: 60
End: 2025-06-15

## 2025-07-09 NOTE — H&P
Preoperative H&P Update    The patient's History and Physical in the medical record from 3/13/23 was reviewed by me today. I reviewed the HPI, medications, allergies, reason for surgery, diagnosis and treatment plan and there has been no interval change. The patient was examined by me today.  Physical exam findings for this update include:    /71   Pulse 65   Temp 97 °F (36.1 °C) (Temporal)   Resp 18   Ht 5' 4\" (1.626 m)   Wt 140 lb (63.5 kg)   SpO2 98%   BMI 24.03 kg/m²    Airway is intact  Chest: breathing comfortably  Heart: regular rate  Findings on exam of the body region where surgery is to be performed include: see last office and/or consult note        Electronically signed by Jimbo Puri MD on 3/17/2023 at 9:07 AM ACP

## 2025-07-13 ENCOUNTER — TELEPHONE (OUTPATIENT)
Dept: CASE MANAGEMENT | Age: 60
End: 2025-07-13

## 2025-08-04 ENCOUNTER — TELEPHONE (OUTPATIENT)
Dept: PULMONOLOGY | Age: 60
End: 2025-08-04

## 2025-09-04 ENCOUNTER — HOSPITAL ENCOUNTER (OUTPATIENT)
Dept: CT IMAGING | Age: 60
Discharge: HOME | End: 2025-09-04
Attending: INTERNAL MEDICINE
Payer: COMMERCIAL

## 2025-09-04 DIAGNOSIS — Z87.891 PERSONAL HISTORY OF TOBACCO USE: ICD-10-CM

## 2025-09-04 PROCEDURE — 71271 CT THORAX LUNG CANCER SCR C-: CPT

## (undated) DEVICE — SUTURE PROL SZ 0 L30IN NONABSORBABLE BLU MO-6 L26MM 1/2 CIR 8418H

## (undated) DEVICE — SUTURE NONABSORBABLE BRAIDED 1.3 MM W/ LOOP WHT TIGERLINK

## (undated) DEVICE — BUR SHV L13CM DIA4MM 8 FLUT OVL FOR RAP AGG BNE RESECT

## (undated) DEVICE — SOLUTION IRRIG 3000ML 0.9% SOD CHL USP UROMATIC PLAS CONT

## (undated) DEVICE — SHOULDER CANNULA SET WITHOUT FENESTRATIONS, 5.5 MM (I.D.) X 70 MM: Brand: CONMED

## (undated) DEVICE — NEEDLE SUT FOR EXPRESSEW LL AND LLL SUT PASS EXPRESSEW LLL

## (undated) DEVICE — PASSER SUT 90DEG STR NIT WIRE LOOP 2 FIBERSTICK MFIL SUT

## (undated) DEVICE — PACK,SHOULDER,DRAPE,POUCH: Brand: MEDLINE

## (undated) DEVICE — SPLINT WRST FA R 7

## (undated) DEVICE — GOWN SIRUS NONREIN XL W/TWL: Brand: MEDLINE INDUSTRIES, INC.

## (undated) DEVICE — BLADE SHV L13CM DIA4MM DISECT AGG COOLCUT

## (undated) DEVICE — GARMENT COMPR L FOR 23IN CALF FLOTRN

## (undated) DEVICE — SUTURE PROL SZ 3-0 L18IN NONABSORBABLE BLU L30MM FS-1 3/8 8663G

## (undated) DEVICE — COVER LT HNDL BLU PLAS

## (undated) DEVICE — GLOVE ORANGE PI 7 1/2   MSG9075

## (undated) DEVICE — 3M™ IOBAN™ 2 ANTIMICROBIAL INCISE DRAPE 6650EZ: Brand: IOBAN™ 2

## (undated) DEVICE — SUTURE FIBERWIRE SZ 2 W/ TAPERED NEEDLE BLUE L38IN NONABSORB BLU L26.5MM 1/2 CIRCLE AR7200

## (undated) DEVICE — SHOULDER ARTHROSCOPY: Brand: MEDLINE INDUSTRIES, INC.

## (undated) DEVICE — SUTURE FIBERTAPE FIBERWIRE SZ 2-0 30IN NONABSORB BLU AR72377

## (undated) DEVICE — PROBE ABLAT XL 90DEG ASPIR BPLR RF 1 PC ELECTRD ERGO HNDL

## (undated) DEVICE — STOCKINETTE,IMPERVIOUS,12X48,STERILE: Brand: MEDLINE

## (undated) DEVICE — CANNULA ARTHSCP L7CM ID8.25MM TRNSLUC THRD FLX W/ NO SQUIRT

## (undated) DEVICE — TUBING PMP L16FT MAIN DISP FOR AR-6400 AR-6475

## (undated) DEVICE — ELECTRODE PT RET AD L9FT HI MOIST COND ADH HYDRGEL CORDED